# Patient Record
Sex: FEMALE | Race: WHITE | Employment: UNEMPLOYED | URBAN - METROPOLITAN AREA
[De-identification: names, ages, dates, MRNs, and addresses within clinical notes are randomized per-mention and may not be internally consistent; named-entity substitution may affect disease eponyms.]

---

## 2017-03-29 LAB
HEPATITIS B SURFACE ANTIGEN INTERPRETATION: NORMAL
HEPATITIS C ANTIBODY INTERPRETATION: NORMAL

## 2017-03-30 LAB
HIV-1 AND HIV-2 ANTIBODIES: NORMAL
RPR: NORMAL

## 2017-06-09 LAB
HPV COMMENT: NORMAL
HPV TYPE 16: NOT DETECTED
HPV TYPE 18: NOT DETECTED
HPVOH (OTHER TYPES): NOT DETECTED

## 2018-08-12 PROBLEM — L03.315 CELLULITIS OF PERINEUM: Status: ACTIVE | Noted: 2018-08-12

## 2018-08-15 ENCOUNTER — HOSPITAL ENCOUNTER (OUTPATIENT)
Dept: MAMMOGRAPHY | Age: 38
Discharge: HOME | End: 2018-08-15
Admitting: OBSTETRICS & GYNECOLOGY

## 2018-08-15 DIAGNOSIS — Z12.31 ENCOUNTER FOR SCREENING MAMMOGRAM FOR BREAST CANCER: ICD-10-CM

## 2018-08-15 DIAGNOSIS — N90.9 NONINFLAMMATORY DISORDER OF VULVA AND PERINEUM: ICD-10-CM

## 2018-12-28 ENCOUNTER — HOSPITAL ENCOUNTER (OUTPATIENT)
Age: 38
Discharge: HOME | End: 2018-12-28
Payer: COMMERCIAL

## 2018-12-28 PROCEDURE — 93005 ELECTROCARDIOGRAM TRACING: CPT | Performed by: INTERNAL MEDICINE

## 2019-01-01 LAB
EKG ATRIAL RATE: 88 BPM
EKG DIAGNOSIS: NORMAL
EKG P AXIS: 29 DEGREES
EKG P-R INTERVAL: 144 MS
EKG Q-T INTERVAL: 378 MS
EKG QRS DURATION: 86 MS
EKG QTC CALCULATION (BAZETT): 457 MS
EKG R AXIS: 3 DEGREES
EKG T AXIS: 38 DEGREES
EKG VENTRICULAR RATE: 88 BPM

## 2019-01-01 PROCEDURE — 93010 ELECTROCARDIOGRAM REPORT: CPT | Performed by: INTERNAL MEDICINE

## 2019-01-16 ASSESSMENT — ENCOUNTER SYMPTOMS
DIARRHEA: 0
ABDOMINAL PAIN: 0
NAUSEA: 0
BACK PAIN: 0
WHEEZING: 0
SHORTNESS OF BREATH: 0
SORE THROAT: 0
CONSTIPATION: 0
BLOOD IN STOOL: 0
COUGH: 0
SINUS PAIN: 0
VOMITING: 0

## 2019-01-17 ENCOUNTER — OFFICE VISIT (OUTPATIENT)
Dept: INTERNAL MEDICINE CLINIC | Age: 39
End: 2019-01-17
Payer: COMMERCIAL

## 2019-01-17 VITALS
HEART RATE: 96 BPM | SYSTOLIC BLOOD PRESSURE: 124 MMHG | HEIGHT: 69 IN | DIASTOLIC BLOOD PRESSURE: 72 MMHG | WEIGHT: 243.6 LBS | BODY MASS INDEX: 36.08 KG/M2

## 2019-01-17 DIAGNOSIS — E11.9 TYPE 2 DIABETES MELLITUS WITHOUT COMPLICATION, WITHOUT LONG-TERM CURRENT USE OF INSULIN (HCC): ICD-10-CM

## 2019-01-17 DIAGNOSIS — F25.0 SCHIZOAFFECTIVE DISORDER, BIPOLAR TYPE (HCC): ICD-10-CM

## 2019-01-17 DIAGNOSIS — J45.909 UNCOMPLICATED ASTHMA, UNSPECIFIED ASTHMA SEVERITY, UNSPECIFIED WHETHER PERSISTENT: ICD-10-CM

## 2019-01-17 DIAGNOSIS — Z23 NEED FOR PNEUMOCOCCAL VACCINATION: ICD-10-CM

## 2019-01-17 DIAGNOSIS — Z76.89 ENCOUNTER TO ESTABLISH CARE WITH NEW DOCTOR: Primary | ICD-10-CM

## 2019-01-17 DIAGNOSIS — F17.200 SMOKER: ICD-10-CM

## 2019-01-17 DIAGNOSIS — Z23 NEED FOR INFLUENZA VACCINATION: ICD-10-CM

## 2019-01-17 DIAGNOSIS — E78.2 MIXED HYPERLIPIDEMIA: ICD-10-CM

## 2019-01-17 PROCEDURE — 90682 RIV4 VACC RECOMBINANT DNA IM: CPT | Performed by: NURSE PRACTITIONER

## 2019-01-17 PROCEDURE — G0009 ADMIN PNEUMOCOCCAL VACCINE: HCPCS | Performed by: NURSE PRACTITIONER

## 2019-01-17 PROCEDURE — 99204 OFFICE O/P NEW MOD 45 MIN: CPT | Performed by: NURSE PRACTITIONER

## 2019-01-17 PROCEDURE — 90732 PPSV23 VACC 2 YRS+ SUBQ/IM: CPT | Performed by: NURSE PRACTITIONER

## 2019-01-17 PROCEDURE — G0008 ADMIN INFLUENZA VIRUS VAC: HCPCS | Performed by: NURSE PRACTITIONER

## 2019-01-17 RX ORDER — GLIPIZIDE 10 MG/1
10 TABLET, FILM COATED, EXTENDED RELEASE ORAL DAILY
Qty: 30 TABLET | Refills: 1 | Status: SHIPPED | OUTPATIENT
Start: 2019-01-17 | End: 2019-04-06 | Stop reason: SDUPTHER

## 2019-01-17 RX ORDER — TRAZODONE HYDROCHLORIDE 100 MG/1
100 TABLET ORAL NIGHTLY
COMMUNITY

## 2019-01-17 RX ORDER — PRAZOSIN HYDROCHLORIDE 1 MG/1
1 CAPSULE ORAL NIGHTLY
COMMUNITY
End: 2020-07-15 | Stop reason: ALTCHOICE

## 2019-01-17 RX ORDER — GLIPIZIDE 10 MG/1
10 TABLET, FILM COATED, EXTENDED RELEASE ORAL DAILY
COMMUNITY
End: 2019-01-17 | Stop reason: SDUPTHER

## 2019-01-17 RX ORDER — GEMFIBROZIL 600 MG/1
600 TABLET, FILM COATED ORAL 2 TIMES DAILY
Qty: 60 TABLET | Refills: 1 | Status: SHIPPED | OUTPATIENT
Start: 2019-01-17 | End: 2019-04-06 | Stop reason: SDUPTHER

## 2019-01-17 ASSESSMENT — PATIENT HEALTH QUESTIONNAIRE - PHQ9
SUM OF ALL RESPONSES TO PHQ QUESTIONS 1-9: 0
1. LITTLE INTEREST OR PLEASURE IN DOING THINGS: 0
SUM OF ALL RESPONSES TO PHQ QUESTIONS 1-9: 0
SUM OF ALL RESPONSES TO PHQ9 QUESTIONS 1 & 2: 0
2. FEELING DOWN, DEPRESSED OR HOPELESS: 0

## 2019-02-15 DIAGNOSIS — E11.9 TYPE 2 DIABETES MELLITUS WITHOUT COMPLICATION, WITHOUT LONG-TERM CURRENT USE OF INSULIN (HCC): ICD-10-CM

## 2019-02-15 RX ORDER — DULAGLUTIDE 0.75 MG/.5ML
INJECTION, SOLUTION SUBCUTANEOUS
Qty: 4 ML | Refills: 0 | Status: SHIPPED | OUTPATIENT
Start: 2019-02-15 | End: 2019-03-22 | Stop reason: SDUPTHER

## 2019-03-22 DIAGNOSIS — E11.9 TYPE 2 DIABETES MELLITUS WITHOUT COMPLICATION, WITHOUT LONG-TERM CURRENT USE OF INSULIN (HCC): ICD-10-CM

## 2019-03-22 RX ORDER — DULAGLUTIDE 0.75 MG/.5ML
INJECTION, SOLUTION SUBCUTANEOUS
Qty: 2 ML | Refills: 0 | Status: SHIPPED | OUTPATIENT
Start: 2019-03-22 | End: 2019-04-06 | Stop reason: SDUPTHER

## 2019-03-28 ENCOUNTER — HOSPITAL ENCOUNTER (OUTPATIENT)
Age: 39
Discharge: HOME | End: 2019-03-28
Payer: COMMERCIAL

## 2019-03-28 DIAGNOSIS — E11.9 TYPE 2 DIABETES MELLITUS WITHOUT COMPLICATION, WITHOUT LONG-TERM CURRENT USE OF INSULIN (HCC): ICD-10-CM

## 2019-03-28 DIAGNOSIS — E78.2 MIXED HYPERLIPIDEMIA: ICD-10-CM

## 2019-03-28 LAB
A/G RATIO: 1 (ref 1.1–2.2)
ALBUMIN SERPL-MCNC: 4.5 G/DL (ref 3.4–5)
ALP BLD-CCNC: 69 U/L (ref 40–129)
ALT SERPL-CCNC: 23 U/L (ref 10–40)
ANION GAP SERPL CALCULATED.3IONS-SCNC: 15 MMOL/L (ref 3–16)
AST SERPL-CCNC: 22 U/L (ref 15–37)
BASOPHILS ABSOLUTE: 0.1 K/UL (ref 0–0.2)
BASOPHILS RELATIVE PERCENT: 1.1 %
BILIRUB SERPL-MCNC: <0.2 MG/DL (ref 0–1)
BUN BLDV-MCNC: 13 MG/DL (ref 7–20)
CALCIUM SERPL-MCNC: 9.5 MG/DL (ref 8.3–10.6)
CHLORIDE BLD-SCNC: 102 MMOL/L (ref 99–110)
CHOLESTEROL, FASTING: 226 MG/DL (ref 0–199)
CO2: 22 MMOL/L (ref 21–32)
CREAT SERPL-MCNC: 0.6 MG/DL (ref 0.6–1.1)
CREATININE URINE: 177.4 MG/DL (ref 28–259)
EOSINOPHILS ABSOLUTE: 0.3 K/UL (ref 0–0.6)
EOSINOPHILS RELATIVE PERCENT: 2.5 %
GFR AFRICAN AMERICAN: >60
GFR NON-AFRICAN AMERICAN: >60
GLOBULIN: 4.3 G/DL
GLUCOSE BLD-MCNC: 193 MG/DL (ref 70–99)
HCT VFR BLD CALC: 41.7 % (ref 36–48)
HDLC SERPL-MCNC: 36 MG/DL (ref 40–60)
HEMOGLOBIN: 13.9 G/DL (ref 12–16)
LDL CHOLESTEROL CALCULATED: 146 MG/DL
LYMPHOCYTES ABSOLUTE: 3.5 K/UL (ref 1–5.1)
LYMPHOCYTES RELATIVE PERCENT: 27 %
MCH RBC QN AUTO: 29 PG (ref 26–34)
MCHC RBC AUTO-ENTMCNC: 33.2 G/DL (ref 31–36)
MCV RBC AUTO: 87.3 FL (ref 80–100)
MICROALBUMIN UR-MCNC: 1.9 MG/DL
MICROALBUMIN/CREAT UR-RTO: 10.7 MG/G (ref 0–30)
MONOCYTES ABSOLUTE: 0.9 K/UL (ref 0–1.3)
MONOCYTES RELATIVE PERCENT: 6.9 %
NEUTROPHILS ABSOLUTE: 8 K/UL (ref 1.7–7.7)
NEUTROPHILS RELATIVE PERCENT: 62.5 %
PDW BLD-RTO: 13.4 % (ref 12.4–15.4)
PLATELET # BLD: 249 K/UL (ref 135–450)
PMV BLD AUTO: 9.6 FL (ref 5–10.5)
POTASSIUM SERPL-SCNC: 4.3 MMOL/L (ref 3.5–5.1)
RBC # BLD: 4.77 M/UL (ref 4–5.2)
SODIUM BLD-SCNC: 139 MMOL/L (ref 136–145)
TOTAL PROTEIN: 8.8 G/DL (ref 6.4–8.2)
TRIGLYCERIDE, FASTING: 220 MG/DL (ref 0–150)
VLDLC SERPL CALC-MCNC: 44 MG/DL
WBC # BLD: 12.8 K/UL (ref 4–11)

## 2019-03-28 PROCEDURE — 80061 LIPID PANEL: CPT

## 2019-03-28 PROCEDURE — 82570 ASSAY OF URINE CREATININE: CPT

## 2019-03-28 PROCEDURE — 85025 COMPLETE CBC W/AUTO DIFF WBC: CPT

## 2019-03-28 PROCEDURE — 82043 UR ALBUMIN QUANTITATIVE: CPT

## 2019-03-28 PROCEDURE — 80053 COMPREHEN METABOLIC PANEL: CPT

## 2019-03-28 PROCEDURE — 83036 HEMOGLOBIN GLYCOSYLATED A1C: CPT

## 2019-03-28 PROCEDURE — 36415 COLL VENOUS BLD VENIPUNCTURE: CPT

## 2019-03-29 LAB
ESTIMATED AVERAGE GLUCOSE: 165.7 MG/DL
HBA1C MFR BLD: 7.4 %

## 2019-04-15 ASSESSMENT — ENCOUNTER SYMPTOMS
VOMITING: 0
CONSTIPATION: 0
ABDOMINAL PAIN: 0
WHEEZING: 0
SHORTNESS OF BREATH: 0
COUGH: 0
DIARRHEA: 0
NAUSEA: 0

## 2019-04-15 NOTE — PROGRESS NOTES
reviewed with the pt. - gemfibrozil (LOPID) 600 MG tablet; Take 1 tablet by mouth 2 times daily    Uncomplicated asthma, unspecified asthma severity, unspecified whether persistent   - Continue albuterol as needed. Smoker   - Smoking cessation recommended. The patient is smoking 2 packs per day. She is not interested in quitting at this time. Schizoaffective Disorder              -           The patient is seeing [employer]. She sees a psychiatrist/counseling once monthly.               -           Pt states she is happy on this current regimen    Need for Tdap vaccination   -Last Tetanus was just over 10 years ago- administered today. - patient education handout reviewed with the pt. Return in about 3 months (around 7/18/2019) for DM/HLD/tetanus f/u, or sooner if needed. Pt will call if symptoms worsen or fail to improve. All questions answered. Pt states no further questions or concerns at this time.    Electronically signed by: Christiano Ruiz 04/18/19

## 2019-04-18 ENCOUNTER — OFFICE VISIT (OUTPATIENT)
Dept: INTERNAL MEDICINE CLINIC | Age: 39
End: 2019-04-18
Payer: COMMERCIAL

## 2019-04-18 VITALS
WEIGHT: 241.8 LBS | SYSTOLIC BLOOD PRESSURE: 118 MMHG | DIASTOLIC BLOOD PRESSURE: 72 MMHG | HEART RATE: 96 BPM | BODY MASS INDEX: 35.81 KG/M2 | HEIGHT: 69 IN

## 2019-04-18 DIAGNOSIS — E78.2 MIXED HYPERLIPIDEMIA: ICD-10-CM

## 2019-04-18 DIAGNOSIS — Z23 NEED FOR TDAP VACCINATION: ICD-10-CM

## 2019-04-18 DIAGNOSIS — F17.200 SMOKER: ICD-10-CM

## 2019-04-18 DIAGNOSIS — E11.9 TYPE 2 DIABETES MELLITUS WITHOUT COMPLICATION, WITHOUT LONG-TERM CURRENT USE OF INSULIN (HCC): Primary | ICD-10-CM

## 2019-04-18 DIAGNOSIS — J45.909 UNCOMPLICATED ASTHMA, UNSPECIFIED ASTHMA SEVERITY, UNSPECIFIED WHETHER PERSISTENT: ICD-10-CM

## 2019-04-18 DIAGNOSIS — F25.0 SCHIZOAFFECTIVE DISORDER, BIPOLAR TYPE (HCC): ICD-10-CM

## 2019-04-18 PROCEDURE — 90715 TDAP VACCINE 7 YRS/> IM: CPT | Performed by: NURSE PRACTITIONER

## 2019-04-18 PROCEDURE — 90471 IMMUNIZATION ADMIN: CPT | Performed by: NURSE PRACTITIONER

## 2019-04-18 PROCEDURE — 99214 OFFICE O/P EST MOD 30 MIN: CPT | Performed by: NURSE PRACTITIONER

## 2019-04-18 RX ORDER — GLIPIZIDE 10 MG/1
10 TABLET, FILM COATED, EXTENDED RELEASE ORAL DAILY
Qty: 30 TABLET | Refills: 2 | Status: SHIPPED | OUTPATIENT
Start: 2019-04-18 | End: 2019-07-11 | Stop reason: SDUPTHER

## 2019-04-18 RX ORDER — GEMFIBROZIL 600 MG/1
600 TABLET, FILM COATED ORAL 2 TIMES DAILY
Qty: 60 TABLET | Refills: 2 | Status: SHIPPED | OUTPATIENT
Start: 2019-04-18 | End: 2019-07-11 | Stop reason: SDUPTHER

## 2019-06-03 DIAGNOSIS — E11.9 TYPE 2 DIABETES MELLITUS WITHOUT COMPLICATION, WITHOUT LONG-TERM CURRENT USE OF INSULIN (HCC): ICD-10-CM

## 2019-06-03 RX ORDER — DULAGLUTIDE 0.75 MG/.5ML
INJECTION, SOLUTION SUBCUTANEOUS
Qty: 2 ML | Refills: 1 | Status: SHIPPED | OUTPATIENT
Start: 2019-06-03 | End: 2019-07-11 | Stop reason: SDUPTHER

## 2019-06-03 RX ORDER — SITAGLIPTIN 100 MG/1
TABLET, FILM COATED ORAL
Qty: 30 TABLET | Refills: 0 | OUTPATIENT
Start: 2019-06-03

## 2019-07-03 ASSESSMENT — ENCOUNTER SYMPTOMS
ABDOMINAL PAIN: 0
DIARRHEA: 0
WHEEZING: 0
COUGH: 0
VOMITING: 0
SHORTNESS OF BREATH: 0
NAUSEA: 0
CONSTIPATION: 0

## 2019-07-03 NOTE — PROGRESS NOTES
Office Visit   7/11/2019    Subjective:  Chief Complaint   Patient presents with    Diabetes     3 month f/u a1c     HPI:  Marilee Herbert is a 45 y.o. female who presents to the clinic today for follow up. DM- Taking Glipizide ER 10mg daily and Januvia 100 mg PO daily and Trulicity SQ weekly. She states she has been on these medication for two years without side effects. A1C was not at goal. No hypoglycemic events. She does not take her blood sugar at home. Asymptomatic. Metformin caused \"explosive diarrhea. \" She states that she is not eating healthy and is not exercising. Drinks a lot of soda.     Asthma- childhood asthma. Uses albuterol as needed with relief.     Hyperlipidemia-the patient is taking gemfibrozil 1 tablet by mouth twice daily without side effects. She would like to continue this medication at this time.     Mood- Continues to follow with Cecy Echavarria. Following with psychiatry and psychology. Stable at this time. No SI/HI.     Smoker- Continues to smoke 2 ppd. Not interested in quitting. CBC next visit- elevated WBC- pt states this is chronic. Will re-evaluate today. Asymptomatic. Pt reports white thick vaginal discharge. No itching. No vaginal bleeding. No urinary symptoms- hematuria, dysuria, frequency, urgency, odor or cloudiness. More sexually active recently per pt. Not using protection. LMP was 06/17/19. Review of Systems   Constitutional: Negative for appetite change, chills, fatigue, fever and unexpected weight change. Eyes: Negative for visual disturbance. Respiratory: Negative for cough, shortness of breath and wheezing. Cardiovascular: Negative for chest pain, palpitations and leg swelling. Gastrointestinal: Negative for abdominal pain, blood in stool, constipation, diarrhea, nausea and vomiting. Endocrine: Negative for polydipsia, polyphagia and polyuria. Genitourinary: Positive for vaginal discharge.  Negative for decreased urine volume, difficulty elevated. Will redraw today. - CBC Auto Differential; Future    Return in about 3 months (around 10/11/2019) for DM/HLD/asthma/smoker f/u, or sooner if needed. Pt will call if symptoms worsen or fail to improve. All questions answered. Pt states no further questions or concerns at this time.    Electronically signed by: Renan Shabazz 07/11/19

## 2019-07-11 ENCOUNTER — OFFICE VISIT (OUTPATIENT)
Dept: INTERNAL MEDICINE CLINIC | Age: 39
End: 2019-07-11
Payer: COMMERCIAL

## 2019-07-11 VITALS
WEIGHT: 242 LBS | SYSTOLIC BLOOD PRESSURE: 130 MMHG | BODY MASS INDEX: 35.84 KG/M2 | OXYGEN SATURATION: 98 % | DIASTOLIC BLOOD PRESSURE: 85 MMHG | HEART RATE: 100 BPM | HEIGHT: 69 IN

## 2019-07-11 DIAGNOSIS — F17.200 SMOKER: ICD-10-CM

## 2019-07-11 DIAGNOSIS — D72.828 OTHER ELEVATED WHITE BLOOD CELL (WBC) COUNT: ICD-10-CM

## 2019-07-11 DIAGNOSIS — E11.9 TYPE 2 DIABETES MELLITUS WITHOUT COMPLICATION, WITHOUT LONG-TERM CURRENT USE OF INSULIN (HCC): Primary | ICD-10-CM

## 2019-07-11 DIAGNOSIS — E78.2 MIXED HYPERLIPIDEMIA: ICD-10-CM

## 2019-07-11 DIAGNOSIS — N89.8 VAGINAL DISCHARGE: ICD-10-CM

## 2019-07-11 DIAGNOSIS — J45.909 UNCOMPLICATED ASTHMA, UNSPECIFIED ASTHMA SEVERITY, UNSPECIFIED WHETHER PERSISTENT: ICD-10-CM

## 2019-07-11 LAB
BASOPHILS ABSOLUTE: 0.1 K/UL (ref 0–0.2)
BASOPHILS RELATIVE PERCENT: 0.6 %
CONTROL: NORMAL
EOSINOPHILS ABSOLUTE: 0.3 K/UL (ref 0–0.6)
EOSINOPHILS RELATIVE PERCENT: 2.2 %
HBA1C MFR BLD: 7.5 %
HCT VFR BLD CALC: 42 % (ref 36–48)
HEMOGLOBIN: 13.8 G/DL (ref 12–16)
LYMPHOCYTES ABSOLUTE: 4.6 K/UL (ref 1–5.1)
LYMPHOCYTES RELATIVE PERCENT: 30.4 %
MCH RBC QN AUTO: 29 PG (ref 26–34)
MCHC RBC AUTO-ENTMCNC: 32.9 G/DL (ref 31–36)
MCV RBC AUTO: 88 FL (ref 80–100)
MONOCYTES ABSOLUTE: 1.3 K/UL (ref 0–1.3)
MONOCYTES RELATIVE PERCENT: 8.6 %
NEUTROPHILS ABSOLUTE: 8.7 K/UL (ref 1.7–7.7)
NEUTROPHILS RELATIVE PERCENT: 58.2 %
PDW BLD-RTO: 13.2 % (ref 12.4–15.4)
PLATELET # BLD: 258 K/UL (ref 135–450)
PMV BLD AUTO: 9 FL (ref 5–10.5)
PREGNANCY TEST URINE, POC: NEGATIVE
RBC # BLD: 4.77 M/UL (ref 4–5.2)
WBC # BLD: 15 K/UL (ref 4–11)

## 2019-07-11 PROCEDURE — 99214 OFFICE O/P EST MOD 30 MIN: CPT | Performed by: NURSE PRACTITIONER

## 2019-07-11 PROCEDURE — 81025 URINE PREGNANCY TEST: CPT | Performed by: NURSE PRACTITIONER

## 2019-07-11 PROCEDURE — 83036 HEMOGLOBIN GLYCOSYLATED A1C: CPT | Performed by: NURSE PRACTITIONER

## 2019-07-11 RX ORDER — ALBUTEROL SULFATE 90 UG/1
2 AEROSOL, METERED RESPIRATORY (INHALATION) EVERY 6 HOURS PRN
Qty: 1 INHALER | Refills: 1 | Status: SHIPPED | OUTPATIENT
Start: 2019-07-11 | End: 2019-10-15 | Stop reason: SDUPTHER

## 2019-07-11 RX ORDER — GLIPIZIDE 10 MG/1
20 TABLET, FILM COATED, EXTENDED RELEASE ORAL DAILY
Qty: 60 TABLET | Refills: 2 | Status: SHIPPED | OUTPATIENT
Start: 2019-07-11 | End: 2019-10-15 | Stop reason: SDUPTHER

## 2019-07-11 RX ORDER — GEMFIBROZIL 600 MG/1
600 TABLET, FILM COATED ORAL 2 TIMES DAILY
Qty: 60 TABLET | Refills: 2 | Status: SHIPPED | OUTPATIENT
Start: 2019-07-11 | End: 2019-10-15 | Stop reason: SDUPTHER

## 2019-07-11 ASSESSMENT — ENCOUNTER SYMPTOMS
BLOOD IN STOOL: 0
BACK PAIN: 0

## 2019-07-12 LAB
C. TRACHOMATIS DNA ,URINE: NEGATIVE
CANDIDA SPECIES, DNA PROBE: NORMAL
GARDNERELLA VAGINALIS, DNA PROBE: NORMAL
N. GONORRHOEAE DNA, URINE: NEGATIVE
TRICHOMONAS VAGINALIS DNA: NORMAL

## 2019-08-06 DIAGNOSIS — E11.9 TYPE 2 DIABETES MELLITUS WITHOUT COMPLICATION, WITHOUT LONG-TERM CURRENT USE OF INSULIN (HCC): ICD-10-CM

## 2019-08-06 RX ORDER — DULAGLUTIDE 0.75 MG/.5ML
INJECTION, SOLUTION SUBCUTANEOUS
Qty: 2 ML | Refills: 0 | Status: SHIPPED | OUTPATIENT
Start: 2019-08-06 | End: 2019-10-07

## 2019-09-07 DIAGNOSIS — E11.9 TYPE 2 DIABETES MELLITUS WITHOUT COMPLICATION, WITHOUT LONG-TERM CURRENT USE OF INSULIN (HCC): ICD-10-CM

## 2019-09-10 RX ORDER — SITAGLIPTIN 100 MG/1
TABLET, FILM COATED ORAL
Qty: 30 TABLET | Refills: 1 | Status: SHIPPED | OUTPATIENT
Start: 2019-09-10 | End: 2019-10-15 | Stop reason: SDUPTHER

## 2019-10-06 DIAGNOSIS — E11.9 TYPE 2 DIABETES MELLITUS WITHOUT COMPLICATION, WITHOUT LONG-TERM CURRENT USE OF INSULIN (HCC): ICD-10-CM

## 2019-10-15 ENCOUNTER — OFFICE VISIT (OUTPATIENT)
Dept: INTERNAL MEDICINE CLINIC | Age: 39
End: 2019-10-15
Payer: COMMERCIAL

## 2019-10-15 VITALS
DIASTOLIC BLOOD PRESSURE: 70 MMHG | WEIGHT: 239 LBS | HEART RATE: 110 BPM | SYSTOLIC BLOOD PRESSURE: 124 MMHG | OXYGEN SATURATION: 98 % | BODY MASS INDEX: 35.29 KG/M2

## 2019-10-15 DIAGNOSIS — F17.200 SMOKER: ICD-10-CM

## 2019-10-15 DIAGNOSIS — D72.828 OTHER ELEVATED WHITE BLOOD CELL (WBC) COUNT: ICD-10-CM

## 2019-10-15 DIAGNOSIS — E78.2 MIXED HYPERLIPIDEMIA: ICD-10-CM

## 2019-10-15 DIAGNOSIS — E11.9 TYPE 2 DIABETES MELLITUS WITHOUT COMPLICATION, WITHOUT LONG-TERM CURRENT USE OF INSULIN (HCC): Primary | ICD-10-CM

## 2019-10-15 DIAGNOSIS — Z23 NEED FOR INFLUENZA VACCINATION: ICD-10-CM

## 2019-10-15 DIAGNOSIS — F25.0 SCHIZOAFFECTIVE DISORDER, BIPOLAR TYPE (HCC): ICD-10-CM

## 2019-10-15 DIAGNOSIS — J45.909 UNCOMPLICATED ASTHMA, UNSPECIFIED ASTHMA SEVERITY, UNSPECIFIED WHETHER PERSISTENT: ICD-10-CM

## 2019-10-15 LAB — HBA1C MFR BLD: 7.4 %

## 2019-10-15 PROCEDURE — 99214 OFFICE O/P EST MOD 30 MIN: CPT | Performed by: NURSE PRACTITIONER

## 2019-10-15 PROCEDURE — 83036 HEMOGLOBIN GLYCOSYLATED A1C: CPT | Performed by: NURSE PRACTITIONER

## 2019-10-15 PROCEDURE — 90686 IIV4 VACC NO PRSV 0.5 ML IM: CPT | Performed by: NURSE PRACTITIONER

## 2019-10-15 PROCEDURE — G0008 ADMIN INFLUENZA VIRUS VAC: HCPCS | Performed by: NURSE PRACTITIONER

## 2019-10-15 RX ORDER — GEMFIBROZIL 600 MG/1
600 TABLET, FILM COATED ORAL 2 TIMES DAILY
Qty: 60 TABLET | Refills: 2 | Status: SHIPPED | OUTPATIENT
Start: 2019-10-15 | End: 2020-01-14 | Stop reason: SDUPTHER

## 2019-10-15 RX ORDER — ALBUTEROL SULFATE 90 UG/1
2 AEROSOL, METERED RESPIRATORY (INHALATION) EVERY 6 HOURS PRN
Qty: 1 INHALER | Refills: 1 | Status: SHIPPED | OUTPATIENT
Start: 2019-10-15 | End: 2020-01-14 | Stop reason: SDUPTHER

## 2019-10-15 RX ORDER — GLIPIZIDE 10 MG/1
20 TABLET, FILM COATED, EXTENDED RELEASE ORAL DAILY
Qty: 60 TABLET | Refills: 2 | Status: SHIPPED | OUTPATIENT
Start: 2019-10-15 | End: 2020-01-14 | Stop reason: SDUPTHER

## 2019-10-15 ASSESSMENT — ENCOUNTER SYMPTOMS
WHEEZING: 0
ABDOMINAL PAIN: 0
VOMITING: 0
SHORTNESS OF BREATH: 0
COUGH: 0
CONSTIPATION: 0
DIARRHEA: 0
NAUSEA: 0

## 2019-11-01 LAB
HEPATITIS B SURFACE ANTIGEN INTERPRETATION: NORMAL
HEPATITIS C ANTIBODY INTERPRETATION: NORMAL

## 2019-11-02 LAB
HIV AG/AB: NORMAL
HIV ANTIGEN: NORMAL
HIV-1 ANTIBODY: NORMAL
HIV-2 AB: NORMAL

## 2019-11-04 LAB
ORGANISM: ABNORMAL
URINE CULTURE, ROUTINE: ABNORMAL

## 2020-01-09 ASSESSMENT — ENCOUNTER SYMPTOMS
DIARRHEA: 0
COUGH: 0
WHEEZING: 0
SHORTNESS OF BREATH: 0
CONSTIPATION: 0
ABDOMINAL PAIN: 0
NAUSEA: 0
VOMITING: 0

## 2020-01-09 NOTE — PROGRESS NOTES
Hyperlipidemia      Wt Readings from Last 3 Encounters:   01/14/20 235 lb (106.6 kg)   10/15/19 239 lb (108.4 kg)   07/11/19 242 lb (109.8 kg)     BP Readings from Last 3 Encounters:   01/14/20 124/70   10/15/19 124/70   07/11/19 130/85     The ASCVD Risk score (Kesha Cardoza, et al., 2013) failed to calculate for the following reasons: The 2013 ASCVD risk score is only valid for ages 36 to 78    PHQ-9 Total Score: 3 (1/14/2020  2:38 PM)  Thoughts that you would be better off dead, or of hurting yourself in some way: 0 (1/14/2020  2:38 PM)    Objective/Physical Exam:  /70   Pulse 91   Wt 235 lb (106.6 kg)   SpO2 98%   BMI 34.70 kg/m²   Body mass index is 34.7 kg/m². Physical Exam  Vitals signs reviewed. Constitutional:       General: She is not in acute distress. Appearance: She is well-developed. She is not diaphoretic. HENT:      Head: Normocephalic and atraumatic. Nose: Nose normal.      Mouth/Throat:      Mouth: Mucous membranes are moist.   Eyes:      Pupils: Pupils are equal, round, and reactive to light. Cardiovascular:      Rate and Rhythm: Normal rate and regular rhythm. Pulmonary:      Effort: Pulmonary effort is normal. No respiratory distress. Breath sounds: Normal breath sounds. No wheezing or rales. Chest:      Chest wall: No tenderness. Abdominal:      General: Bowel sounds are normal.      Palpations: Abdomen is soft. Skin:     General: Skin is warm and dry. Coloration: Skin is not pale. Findings: No erythema or rash. Neurological:      Mental Status: She is alert and oriented to person, place, and time. Coordination: Coordination normal.   Psychiatric:         Mood and Affect: Mood normal.       Assessment and Plan:  Aaron Nicholson was seen today for diabetes and mood swings. Diagnoses and all orders for this visit:    Type 2 diabetes mellitus without complication, without long-term current use of insulin ([address]  -    Not to goal today.    -     POCT worsen or fail to improve. All questions answered. Pt states no further questions or concerns at this time.    Electronically signed by: Josefa Alcantar 01/14/20

## 2020-01-14 ENCOUNTER — OFFICE VISIT (OUTPATIENT)
Dept: INTERNAL MEDICINE CLINIC | Age: 40
End: 2020-01-14
Payer: COMMERCIAL

## 2020-01-14 VITALS
DIASTOLIC BLOOD PRESSURE: 70 MMHG | SYSTOLIC BLOOD PRESSURE: 124 MMHG | HEART RATE: 91 BPM | OXYGEN SATURATION: 98 % | WEIGHT: 235 LBS | BODY MASS INDEX: 34.7 KG/M2

## 2020-01-14 LAB — HBA1C MFR BLD: 7.5 %

## 2020-01-14 PROCEDURE — 83036 HEMOGLOBIN GLYCOSYLATED A1C: CPT | Performed by: NURSE PRACTITIONER

## 2020-01-14 PROCEDURE — G0444 DEPRESSION SCREEN ANNUAL: HCPCS | Performed by: NURSE PRACTITIONER

## 2020-01-14 PROCEDURE — 99214 OFFICE O/P EST MOD 30 MIN: CPT | Performed by: NURSE PRACTITIONER

## 2020-01-14 RX ORDER — FAMOTIDINE 40 MG/1
40 TABLET, FILM COATED ORAL EVERY EVENING
Qty: 90 TABLET | Refills: 1 | Status: SHIPPED | OUTPATIENT
Start: 2020-01-14 | End: 2020-03-19

## 2020-01-14 RX ORDER — GEMFIBROZIL 600 MG/1
600 TABLET, FILM COATED ORAL 2 TIMES DAILY
Qty: 60 TABLET | Refills: 2 | Status: SHIPPED | OUTPATIENT
Start: 2020-01-14 | End: 2020-04-15 | Stop reason: SDUPTHER

## 2020-01-14 RX ORDER — ALBUTEROL SULFATE 90 UG/1
2 AEROSOL, METERED RESPIRATORY (INHALATION) EVERY 6 HOURS PRN
Qty: 1 INHALER | Refills: 1 | Status: SHIPPED | OUTPATIENT
Start: 2020-01-14 | End: 2020-03-04

## 2020-01-14 RX ORDER — GLIPIZIDE 10 MG/1
20 TABLET, FILM COATED, EXTENDED RELEASE ORAL DAILY
Qty: 60 TABLET | Refills: 2 | Status: SHIPPED | OUTPATIENT
Start: 2020-01-14 | End: 2020-04-15 | Stop reason: SDUPTHER

## 2020-01-14 SDOH — ECONOMIC STABILITY: TRANSPORTATION INSECURITY
IN THE PAST 12 MONTHS, HAS THE LACK OF TRANSPORTATION KEPT YOU FROM MEDICAL APPOINTMENTS OR FROM GETTING MEDICATIONS?: NO

## 2020-01-14 SDOH — ECONOMIC STABILITY: FOOD INSECURITY: WITHIN THE PAST 12 MONTHS, YOU WORRIED THAT YOUR FOOD WOULD RUN OUT BEFORE YOU GOT MONEY TO BUY MORE.: NEVER TRUE

## 2020-01-14 SDOH — ECONOMIC STABILITY: FOOD INSECURITY: WITHIN THE PAST 12 MONTHS, THE FOOD YOU BOUGHT JUST DIDN'T LAST AND YOU DIDN'T HAVE MONEY TO GET MORE.: NEVER TRUE

## 2020-01-14 SDOH — ECONOMIC STABILITY: INCOME INSECURITY: HOW HARD IS IT FOR YOU TO PAY FOR THE VERY BASICS LIKE FOOD, HOUSING, MEDICAL CARE, AND HEATING?: NOT HARD AT ALL

## 2020-01-14 SDOH — ECONOMIC STABILITY: TRANSPORTATION INSECURITY
IN THE PAST 12 MONTHS, HAS LACK OF TRANSPORTATION KEPT YOU FROM MEETINGS, WORK, OR FROM GETTING THINGS NEEDED FOR DAILY LIVING?: NO

## 2020-01-14 ASSESSMENT — PATIENT HEALTH QUESTIONNAIRE - PHQ9
1. LITTLE INTEREST OR PLEASURE IN DOING THINGS: 0
4. FEELING TIRED OR HAVING LITTLE ENERGY: 1
SUM OF ALL RESPONSES TO PHQ9 QUESTIONS 1 & 2: 0
9. THOUGHTS THAT YOU WOULD BE BETTER OFF DEAD, OR OF HURTING YOURSELF: 0
SUM OF ALL RESPONSES TO PHQ QUESTIONS 1-9: 3
5. POOR APPETITE OR OVEREATING: 1
SUM OF ALL RESPONSES TO PHQ QUESTIONS 1-9: 3
2. FEELING DOWN, DEPRESSED OR HOPELESS: 0
8. MOVING OR SPEAKING SO SLOWLY THAT OTHER PEOPLE COULD HAVE NOTICED. OR THE OPPOSITE, BEING SO FIGETY OR RESTLESS THAT YOU HAVE BEEN MOVING AROUND A LOT MORE THAN USUAL: 0
7. TROUBLE CONCENTRATING ON THINGS, SUCH AS READING THE NEWSPAPER OR WATCHING TELEVISION: 0
3. TROUBLE FALLING OR STAYING ASLEEP: 1
6. FEELING BAD ABOUT YOURSELF - OR THAT YOU ARE A FAILURE OR HAVE LET YOURSELF OR YOUR FAMILY DOWN: 0

## 2020-01-14 NOTE — PATIENT INSTRUCTIONS
infections or other urination problems;  · low blood pressure;  · heart problems;  · problems with your pancreas, including surgery;  · if you drink alcohol often; or  · if you are on a low salt diet. Follow your doctor's instructions about using this medicine if you are pregnant. Blood sugar control is very important during pregnancy, and your dose needs may be different during each trimester. You should not use empagliflozin during the second or third trimester of pregnancy. You should not breast-feed while using this medicine. Empagliflozin is not approved for use by anyone younger than 25years old. How should I take empagliflozin? Follow all directions on your prescription label and read all medication guides or instruction sheets. Your doctor may occasionally change your dose. Use the medicine exactly as directed. You may take empagliflozin with or without food. Call your doctor if you are sick with vomiting or diarrhea, if you consume less food or fluid than usual, or if you are sweating more than usual.  Your blood sugar will need to be checked often, and you may also need to test the level of ketones your urine. Empagliflozin can cause life-threatening ketoacidosis (too much acid in the blood). Even if your blood sugar is normal, contact your doctor if a urine test shows that you have ketones in the urine. Low blood sugar (hypoglycemia) can happen to everyone who has diabetes. Symptoms include headache, hunger, sweating, irritability, dizziness, nausea, fast heart rate, and feeling anxious or shaky. To quickly treat low blood sugar, always keep a fast-acting source of sugar with you such as fruit juice, hard candy, crackers, raisins, or non-diet soda. Your doctor can prescribe a glucagon emergency injection kit to use in case you have severe hypoglycemia and cannot eat or drink. Be sure your family and close friends know how to give you this injection in an emergency.   Also watch for signs of high warning for a given drug or drug combination in no way should be construed to indicate that the drug or drug combination is safe, effective or appropriate for any given patient. [facility] does not assume any responsibility for any aspect of healthcare administered with the aid of information [facility] provides. The information contained herein is not intended to cover all possible uses, directions, precautions, warnings, drug interactions, allergic reactions, or adverse effects. If you have questions about the drugs you are taking, check with your doctor, nurse or pharmacist.  Copyright 5302-2206 [address]. Version: 3.01. Revision date: 8/30/2018. Care instructions adapted under license by [facility] ([facility]). If you have questions about a medical condition or this instruction, always ask your healthcare professional. [place] [address] any warranty or liability for your use of this information. Patient Education        famotidine  Pronunciation:  fam OH ti boo  Brand:  Heartburn Relief, Pepcid, Pepcid AC, Pepcid AC Maximum Strength  What is the most important information I should know about famotidine? Follow all directions on your medicine label and package. Tell each of your healthcare providers about all your medical conditions, allergies, and all medicines you use. What is famotidine? Famotidine is used to treat and prevent ulcers in the stomach and intestines. It also treats conditions in which the stomach produces too much acid, such as Zollinger-Loera syndrome. Famotidine also treats gastroesophageal reflux disease (GERD) and other conditions in which acid backs up from the stomach into the esophagus, causing heartburn. Famotidine may also be used for purposes not listed in this medication guide. What should I discuss with my healthcare provider before taking famotidine? Heartburn can mimic early symptoms of a heart attack.  Get emergency medical help if you have chest pain that spreads to your jaw or shoulder and you feel anxious or light-headed. You should not use this medicine if you are allergic to famotidine or similar medicines such as ranitidine (Zantac), cimetidine (Tagamet), or nizatidine (Axid). Ask a doctor or pharmacist if this medicine is safe to use if you have:  · kidney disease;  · liver disease;  · cancer stomach; or  · long QT syndrome (in you or a family member). Ask a doctor before using this medicine if you are pregnant or breast-feeding. How should I take famotidine? Use exactly as directed on the label, or as prescribed by your doctor. You may take famotidine with or without food. Measure liquid medicine carefully. Use the dosing syringe provided, or use a medicine dose-measuring device (not a kitchen spoon). Most ulcers heal within 4 weeks of famotidine treatment, but it may take up to 8 weeks of using this medicine before your ulcer heals. Keep using the medication as directed. Call your doctor if the condition you are treating with famotidine does not improve, or if it gets worse while using famotidine. Famotidine may be only part of a complete program of treatment that also includes changes in diet or lifestyle habits. Follow all instructions of your doctor or dietitian. Store at room temperature away from moisture, heat, and light. Do not allow the liquid  medicine to freeze. Throw away any unused famotidine liquid that is older than 30 days. What happens if I miss a dose? Take the medicine as soon as you can, but skip the missed dose if it is almost time for your next dose. Do not take two doses at one time. What happens if I overdose? Seek emergency medical attention or call the Poison Help line at 1-527.870.2511. What should I avoid while taking famotidine? Avoid drinking alcohol. It can increase the risk of damage to your stomach. Avoid taking other stomach acid reducers unless your doctor has told you to.  However, you may take an antacid Drug information contained herein may be time sensitive. [employer] information has been compiled for use by healthcare practitioners and consumers in the [place] and therefore [employer] does not warrant that uses outside of the [place] are appropriate, unless specifically indicated otherwise. [facility] drug information does not endorse drugs, diagnose patients or recommend therapy. [employer] drug information is an informational resource designed to assist licensed healthcare practitioners in caring for their patients and/or to serve consumers viewing this service as a supplement to, and not a substitute for, the expertise, skill, knowledge and judgment of healthcare practitioners. The absence of a warning for a given drug or drug combination in no way should be construed to indicate that the drug or drug combination is safe, effective or appropriate for any given patient. [facility] does not assume any responsibility for any aspect of healthcare administered with the aid of information [facility] provides. The information contained herein is not intended to cover all possible uses, directions, precautions, warnings, drug interactions, allergic reactions, or adverse effects. If you have questions about the drugs you are taking, check with your doctor, nurse or pharmacist.  Copyright 9975-0766 [address]. Version: 16.01. Revision date: 8/22/2019. Care instructions adapted under license by [facility] ([facility]). If you have questions about a medical condition or this instruction, always ask your healthcare professional. [place] [address] any warranty or liability for your use of this information. Patient Education        Gastroesophageal Reflux Disease (GERD): Care Instructions  Your Care Instructions    Gastroesophageal reflux disease (GERD) is the backward flow of stomach acid into the esophagus. The esophagus is the tube that leads from your throat to your stomach.  A one-way valve prevents the stomach acid from moving up into this tube. When you have GERD, this valve does not close tightly enough. If you have mild GERD symptoms including heartburn, you may be able to control the problem with antacids or over-the-counter medicine. Changing your diet, losing weight, and making other lifestyle changes can also help reduce symptoms. Follow-up care is a key part of your treatment and safety. Be sure to make and go to all appointments, and call your doctor if you are having problems. It's also a good idea to know your test results and keep a list of the medicines you take. How can you care for yourself at home? · Take your medicines exactly as prescribed. Call your doctor if you think you are having a problem with your medicine. · Your doctor may recommend over-the-counter medicine. For mild or occasional indigestion, antacids, such as Tums, Gaviscon, Mylanta, or Maalox, may help. Your doctor also may recommend over-the-counter acid reducers, such as Pepcid AC, Tagamet HB, Zantac 75, or Prilosec. Read and follow all instructions on the label. If you use these medicines often, talk with your doctor. · Change your eating habits. ? It's best to eat several small meals instead of two or three large meals. ? After you eat, wait 2 to 3 hours before you lie down. ? Chocolate, mint, and alcohol can make GERD worse. ? Spicy foods, foods that have a lot of acid (like tomatoes and oranges), and coffee can make GERD symptoms worse in some people. If your symptoms are worse after you eat a certain food, you may want to stop eating that food to see if your symptoms get better. · Do not smoke or chew tobacco. Smoking can make GERD worse. If you need help quitting, talk to your doctor about stop-smoking programs and medicines. These can increase your chances of quitting for good.   · If you have GERD symptoms at night, raise the head of your bed 6 to 8 inches by putting the frame on blocks or placing a foam wedge

## 2020-01-28 ENCOUNTER — TELEPHONE (OUTPATIENT)
Dept: INTERNAL MEDICINE CLINIC | Age: 40
End: 2020-01-28

## 2020-01-28 ENCOUNTER — TELEPHONE (OUTPATIENT)
Dept: ADMINISTRATIVE | Age: 40
End: 2020-01-28

## 2020-01-28 NOTE — TELEPHONE ENCOUNTER
Called in to pharmacy
I called the patient. She is agreeable to Pepcid 20 mg dose. Please call and inform the pharmacy.
Patient was given pepcid ac 40 but that medication is not carried at her pharmacy, so she is asking if she may be prescribed nexium instead.
Controlled with current regime
I have reviewed and confirmed nurses' notes for patient's medications, allergies, medical history, and surgical history.

## 2020-03-04 RX ORDER — ALBUTEROL SULFATE 90 UG/1
AEROSOL, METERED RESPIRATORY (INHALATION)
Qty: 18 G | Refills: 0 | Status: SHIPPED | OUTPATIENT
Start: 2020-03-04 | End: 2020-04-15 | Stop reason: SDUPTHER

## 2020-03-09 RX ORDER — EMPAGLIFLOZIN 10 MG/1
TABLET, FILM COATED ORAL
Qty: 90 TABLET | Refills: 0 | OUTPATIENT
Start: 2020-03-09

## 2020-03-12 ENCOUNTER — TELEPHONE (OUTPATIENT)
Dept: INTERNAL MEDICINE CLINIC | Age: 40
End: 2020-03-12

## 2020-03-19 RX ORDER — EMPAGLIFLOZIN 10 MG/1
TABLET, FILM COATED ORAL
Qty: 90 TABLET | Refills: 0 | Status: SHIPPED | OUTPATIENT
Start: 2020-03-19 | End: 2020-04-15 | Stop reason: SDUPTHER

## 2020-03-19 RX ORDER — FAMOTIDINE 20 MG/1
TABLET, FILM COATED ORAL
Qty: 60 TABLET | Refills: 0 | Status: SHIPPED | OUTPATIENT
Start: 2020-03-19 | End: 2020-04-15 | Stop reason: SDUPTHER

## 2020-04-14 LAB — HBA1C MFR BLD: 6.4 %

## 2020-04-15 ENCOUNTER — VIRTUAL VISIT (OUTPATIENT)
Dept: INTERNAL MEDICINE CLINIC | Age: 40
End: 2020-04-15
Payer: COMMERCIAL

## 2020-04-15 VITALS — WEIGHT: 238 LBS | BODY MASS INDEX: 35.15 KG/M2

## 2020-04-15 PROCEDURE — 83036 HEMOGLOBIN GLYCOSYLATED A1C: CPT | Performed by: NURSE PRACTITIONER

## 2020-04-15 PROCEDURE — 99443 PR PHYS/QHP TELEPHONE EVALUATION 21-30 MIN: CPT | Performed by: NURSE PRACTITIONER

## 2020-04-15 RX ORDER — DULAGLUTIDE 1.5 MG/.5ML
1.5 INJECTION, SOLUTION SUBCUTANEOUS WEEKLY
Qty: 2 ML | Refills: 5 | Status: SHIPPED | OUTPATIENT
Start: 2020-04-15 | End: 2020-07-15 | Stop reason: SDUPTHER

## 2020-04-15 RX ORDER — GEMFIBROZIL 600 MG/1
600 TABLET, FILM COATED ORAL 2 TIMES DAILY
Qty: 180 TABLET | Refills: 0 | Status: SHIPPED | OUTPATIENT
Start: 2020-04-15 | End: 2020-07-15 | Stop reason: SDUPTHER

## 2020-04-15 RX ORDER — ALBUTEROL SULFATE 90 UG/1
AEROSOL, METERED RESPIRATORY (INHALATION)
Qty: 18 G | Refills: 0 | Status: SHIPPED | OUTPATIENT
Start: 2020-04-15 | End: 2020-07-15 | Stop reason: SDUPTHER

## 2020-04-15 RX ORDER — GLIPIZIDE 10 MG/1
20 TABLET, FILM COATED, EXTENDED RELEASE ORAL DAILY
Qty: 180 TABLET | Refills: 0 | Status: SHIPPED | OUTPATIENT
Start: 2020-04-15 | End: 2020-07-15 | Stop reason: SDUPTHER

## 2020-04-15 RX ORDER — FAMOTIDINE 20 MG/1
TABLET, FILM COATED ORAL
Qty: 180 TABLET | Refills: 0 | Status: SHIPPED | OUTPATIENT
Start: 2020-04-15 | End: 2020-07-15 | Stop reason: ALTCHOICE

## 2020-04-15 RX ORDER — EMPAGLIFLOZIN 10 MG/1
TABLET, FILM COATED ORAL
Qty: 90 TABLET | Refills: 0 | Status: SHIPPED | OUTPATIENT
Start: 2020-04-15 | End: 2020-07-15

## 2020-06-08 ENCOUNTER — TELEPHONE (OUTPATIENT)
Dept: INTERNAL MEDICINE CLINIC | Age: 40
End: 2020-06-08

## 2020-06-09 NOTE — TELEPHONE ENCOUNTER
[facility] received a call from:    Name of Caller: Pt    Relationship to patient:Self    Organization name: n/a    Best contact number: on file    Reason for call: Pt returning call, Pt wanting to know if she has to come in office, or if she is ok to do a VV.  Pt also wants to know when to do A1C test.

## 2020-07-10 ASSESSMENT — ENCOUNTER SYMPTOMS
SHORTNESS OF BREATH: 0
CONSTIPATION: 0
ABDOMINAL PAIN: 0
DIARRHEA: 0
NAUSEA: 0
WHEEZING: 0
VOMITING: 0
COUGH: 0

## 2020-07-10 NOTE — PROGRESS NOTES
disturbance and suicidal ideas. The patient is not nervous/anxious. Allergies   Allergen Reactions    Latuda [Lurasidone Hcl] Anaphylaxis     Current Outpatient Rx   Medication Sig Dispense Refill    empagliflozin (JARDIANCE) 25 MG tablet Take 1 tablet by mouth daily 90 tablet 0    gemfibrozil (LOPID) 600 MG tablet Take 1 tablet by mouth 2 times daily 180 tablet 0    glipiZIDE (GLUCOTROL XL) 10 MG extended release tablet Take 2 tablets by mouth daily 180 tablet 0    Dulaglutide (TRULICITY) 1.5 TJ/6.7ZS SOPN Inject 1.5 mg into the skin once a week 2 mL 5    albuterol sulfate  (90 Base) MCG/ACT inhaler INHALE 2 PUFFS BY MOUTH EVERY SIX HOURS AS NEEDED FOR WHEEZING 18 g 0    folic acid-pyridoxine-cyanocobalamine (FOLTX) 2.5-25-1 MG TABS tablet Take 1 tablet by mouth daily      traZODone (DESYREL) 100 MG tablet Take 100 mg by mouth nightly 1-2 tabs nightly      paliperidone Palmitate (INVEGA TRINZA) 273 MG/0.875ML SUSP IM injection Inject 273 mg into the muscle once      clonazePAM (KLONOPIN) 0.5 MG tablet Take 0.5 mg by mouth 3 times daily as needed. [place] Rockwell ziprasidone (GEODON) 60 MG capsule Take 60 mg by mouth every morning      FLUoxetine (PROZAC) 20 MG capsule Take 20 mg by mouth daily      hydrOXYzine (VISTARIL) 50 MG capsule Take 50 mg by mouth 4 times daily as needed for Itching      ziprasidone (GEODON) 80 MG capsule Take 80 mg by mouth 2 times daily (with meals)       Patient Active Problem List   Diagnosis    Overdose of antidepressant    Cellulitis of perineum    Schizophrenia ([facility]    Diabetes mellitus ([facility]    Asthma    Hyperlipidemia      Wt Readings from Last 3 Encounters:   04/15/20 238 lb (108 kg)   01/14/20 235 lb (106.6 kg)   10/15/19 239 lb (108.4 kg)     BP Readings from Last 3 Encounters:   01/14/20 124/70   10/15/19 124/70   07/11/19 130/85     PHQ-9 Total Score: 4 (7/15/2020  1:39 PM)  Thoughts that you would be better off dead, or of hurting yourself in some way: 0 (7/15/2020  1:39 PM)    Objective/Physical Exam:  Virtual Video Exam  Patient-Reported Vitals 7/15/2020   Patient-Reported Weight 229lbs   Patient-Reported Height 5'9.5   Patient-Reported Temperature 98.1   ^no access to other VS d/t VV at this time. Physical Exam  Vitals signs reviewed. Constitutional:       General: She is not in acute distress. Appearance: She is well-developed. She is not diaphoretic. HENT:      Head: Normocephalic and atraumatic. Eyes:      Pupils: Pupils are equal, round, and reactive to light. Cardiovascular:      Rate and Rhythm: Normal rate and regular rhythm. Pulmonary:      Effort: Pulmonary effort is normal. No respiratory distress. Breath sounds: Normal breath sounds. No wheezing or rales. Chest:      Chest wall: No tenderness. Abdominal:      General: Bowel sounds are normal.      Palpations: Abdomen is soft. Skin:     General: Skin is warm and dry. Coloration: Skin is not pale. Findings: No erythema or rash. Neurological:      Mental Status: She is alert and oriented to person, place, and time. Coordination: Coordination normal.   Psychiatric:         Mood and Affect: Mood normal.     Due to this being a TeleHealth encounter, evaluation of the following organ systems is limited: Vitals/Constitutional/EENT/Resp/CV/GI//MS/Neuro/Skin/Heme-Lymph-Imm. Assessment and Plan:  Lois Mendez was seen today for diabetes and mood swings. Diagnoses and all orders for this visit:    Type 2 diabetes mellitus without complication, without long-term current use of insulin ([facility])  -     POCT glycosylated hemoglobin (Hb A1C)- 7.2%  - Lifestyle modifications such as exercise, weight loss and healthy diet encouraged and reviewed with the pt. - Increase jardiance  -     empagliflozin (JARDIANCE) 25 MG tablet; Take 1 tablet by mouth daily- increased dose  -     glipiZIDE (GLUCOTROL XL) 10 MG extended release tablet;  Take 2 tablets by mouth daily- refilled today  - if deemed necessary. Patient identification was verified at the start of the visit: Yes    Total time spent on this encounter: Not billed by time    Services were provided through a video synchronous discussion virtually to substitute for in-person clinic visit. Patient and provider were located at their individual homes. All questions answered. Pt states no further questions or concerns at this time.    Electronically signed by: Shorty Calix 07/15/20

## 2020-07-15 ENCOUNTER — VIRTUAL VISIT (OUTPATIENT)
Dept: INTERNAL MEDICINE CLINIC | Age: 40
End: 2020-07-15
Payer: COMMERCIAL

## 2020-07-15 LAB — HBA1C MFR BLD: 7.2 %

## 2020-07-15 PROCEDURE — 3051F HG A1C>EQUAL 7.0%<8.0%: CPT | Performed by: NURSE PRACTITIONER

## 2020-07-15 PROCEDURE — 96160 PT-FOCUSED HLTH RISK ASSMT: CPT | Performed by: NURSE PRACTITIONER

## 2020-07-15 PROCEDURE — 83036 HEMOGLOBIN GLYCOSYLATED A1C: CPT | Performed by: NURSE PRACTITIONER

## 2020-07-15 PROCEDURE — 99214 OFFICE O/P EST MOD 30 MIN: CPT | Performed by: NURSE PRACTITIONER

## 2020-07-15 RX ORDER — GLIPIZIDE 10 MG/1
20 TABLET, FILM COATED, EXTENDED RELEASE ORAL DAILY
Qty: 180 TABLET | Refills: 0 | Status: SHIPPED | OUTPATIENT
Start: 2020-07-15 | End: 2020-10-06 | Stop reason: SDUPTHER

## 2020-07-15 RX ORDER — GEMFIBROZIL 600 MG/1
600 TABLET, FILM COATED ORAL 2 TIMES DAILY
Qty: 180 TABLET | Refills: 0 | Status: SHIPPED | OUTPATIENT
Start: 2020-07-15 | End: 2020-10-06 | Stop reason: SDUPTHER

## 2020-07-15 RX ORDER — DULAGLUTIDE 1.5 MG/.5ML
1.5 INJECTION, SOLUTION SUBCUTANEOUS WEEKLY
Qty: 2 ML | Refills: 5 | Status: SHIPPED | OUTPATIENT
Start: 2020-07-15 | End: 2020-10-06 | Stop reason: SDUPTHER

## 2020-07-15 RX ORDER — EMPAGLIFLOZIN 25 MG/1
1 TABLET, FILM COATED ORAL DAILY
Qty: 90 TABLET | Refills: 0 | Status: SHIPPED | OUTPATIENT
Start: 2020-07-15 | End: 2020-08-27 | Stop reason: DRUGHIGH

## 2020-07-15 RX ORDER — ALBUTEROL SULFATE 90 UG/1
AEROSOL, METERED RESPIRATORY (INHALATION)
Qty: 18 G | Refills: 0 | Status: SHIPPED | OUTPATIENT
Start: 2020-07-15 | End: 2020-09-08

## 2020-07-15 ASSESSMENT — PATIENT HEALTH QUESTIONNAIRE - PHQ9
2. FEELING DOWN, DEPRESSED OR HOPELESS: 1
5. POOR APPETITE OR OVEREATING: 0
9. THOUGHTS THAT YOU WOULD BE BETTER OFF DEAD, OR OF HURTING YOURSELF: 0
8. MOVING OR SPEAKING SO SLOWLY THAT OTHER PEOPLE COULD HAVE NOTICED. OR THE OPPOSITE, BEING SO FIGETY OR RESTLESS THAT YOU HAVE BEEN MOVING AROUND A LOT MORE THAN USUAL: 0
10. IF YOU CHECKED OFF ANY PROBLEMS, HOW DIFFICULT HAVE THESE PROBLEMS MADE IT FOR YOU TO DO YOUR WORK, TAKE CARE OF THINGS AT HOME, OR GET ALONG WITH OTHER PEOPLE: 2
1. LITTLE INTEREST OR PLEASURE IN DOING THINGS: 0
4. FEELING TIRED OR HAVING LITTLE ENERGY: 3
SUM OF ALL RESPONSES TO PHQ9 QUESTIONS 1 & 2: 1
7. TROUBLE CONCENTRATING ON THINGS, SUCH AS READING THE NEWSPAPER OR WATCHING TELEVISION: 0
3. TROUBLE FALLING OR STAYING ASLEEP: 0
SUM OF ALL RESPONSES TO PHQ QUESTIONS 1-9: 4
SUM OF ALL RESPONSES TO PHQ QUESTIONS 1-9: 4
6. FEELING BAD ABOUT YOURSELF - OR THAT YOU ARE A FAILURE OR HAVE LET YOURSELF OR YOUR FAMILY DOWN: 0

## 2020-07-23 ENCOUNTER — VIRTUAL VISIT (OUTPATIENT)
Dept: INTERNAL MEDICINE CLINIC | Age: 40
End: 2020-07-23
Payer: COMMERCIAL

## 2020-07-23 VITALS — BODY MASS INDEX: 35.15 KG/M2 | HEIGHT: 69 IN

## 2020-07-23 PROCEDURE — G0438 PPPS, INITIAL VISIT: HCPCS | Performed by: NURSE PRACTITIONER

## 2020-07-23 PROCEDURE — 3051F HG A1C>EQUAL 7.0%<8.0%: CPT | Performed by: NURSE PRACTITIONER

## 2020-07-23 ASSESSMENT — PATIENT HEALTH QUESTIONNAIRE - PHQ9
SUM OF ALL RESPONSES TO PHQ QUESTIONS 1-9: 0
SUM OF ALL RESPONSES TO PHQ QUESTIONS 1-9: 0

## 2020-07-23 ASSESSMENT — LIFESTYLE VARIABLES: HOW OFTEN DO YOU HAVE A DRINK CONTAINING ALCOHOL: 0

## 2020-07-23 NOTE — PROGRESS NOTES
Medicare Annual Wellness Visit  Name: Alisha Fish Date: 2020   MRN: [id number] Sex: Female   Age: 44 y.o. Ethnicity: Non-/Non    : 1980 Race: Marion Galvez is here for Medicare AWV (no concerns/complaints )    Screenings for behavioral, psychosocial and functional/safety risks, and cognitive dysfunction are all negative except as indicated below. These results, as well as other patient data from the [address] form, are documented in Flowsheets linked to this Encounter. Allergies   Allergen Reactions    Latuda [Lurasidone Hcl] Anaphylaxis         Prior to Visit Medications    Medication Sig Taking? Authorizing Provider   empagliflozin (JARDIANCE) 25 MG tablet Take 1 tablet by mouth daily Yes ARTI Reeves CNP   gemfibrozil (LOPID) 600 MG tablet Take 1 tablet by mouth 2 times daily Yes ARTI Reeves CNP   glipiZIDE (GLUCOTROL XL) 10 MG extended release tablet Take 2 tablets by mouth daily Yes ARTI Reeves CNP   Dulaglutide (TRULICITY) 1.5 YP/6.4CJ SOPN Inject 1.5 mg into the skin once a week Yes ARTI Reeves CNP   albuterol sulfate  (90 Base) MCG/ACT inhaler INHALE 2 PUFFS BY MOUTH EVERY SIX HOURS AS NEEDED FOR WHEEZING Yes ARTI Reeves CNP   folic acid-pyridoxine-cyanocobalamine (FOLTX) 2.5-25-1 MG TABS tablet Take 1 tablet by mouth daily Yes Historical Provider, MD   traZODone (DESYREL) 100 MG tablet Take 100 mg by mouth nightly 1-2 tabs nightly Yes Historical Provider, MD   paliperidone Palmitate (INVEGA TRINZA) 273 MG/0.875ML SUSP IM injection Inject 273 mg into the muscle once Yes Historical Provider, MD   clonazePAM (KLONOPIN) 0.5 MG tablet Take 0.5 mg by mouth 3 times daily as needed. . Yes Historical Provider, MD   ziprasidone (GEODON) 60 MG capsule Take 60 mg by mouth every morning Yes Historical Provider, MD   FLUoxetine (PROZAC) 20 MG capsule Take 20 mg by mouth daily Yes Historical Provider, MD   hydrOXYzine (VISTARIL) 50 MG capsule Take 50 mg by mouth 4 times daily as needed for Itching Yes Historical Provider, MD   ziprasidone (GEODON) 80 MG capsule Take 80 mg by mouth 2 times daily (with meals) Yes Historical Provider, MD         Past Medical History:   Diagnosis Date    Asthma     Diabetes mellitus ([facility] [address]     Erb's palsy     left arm    Hyperlipidemia     Schizoaffective disorder ([facility] [address]     diag 13years old       Past Surgical History:   Procedure Laterality Date    TONSILLECTOMY AND ADENOIDECTOMY  1992    ADENOIDS         Family History   Problem Relation Age of Onset    Depression Mother         in remission    Alcohol Abuse Mother         history    Osteoporosis Mother    Fabian Arthritis Mother         RA    Other Mother         arthritis    Alcohol Abuse Father     High Blood Pressure Father     Asthma Sister         childhood    Alcohol Abuse Sister     Breast Cancer Maternal Aunt     Lung Cancer Maternal Grandmother     Alcohol Abuse Maternal Grandfather     Cancer Maternal Grandfather     Mental Illness Maternal Grandfather         undiagnosed    Colon Cancer Maternal Grandfather     Esophageal Cancer Maternal Grandfather     Alcohol Abuse Paternal Grandmother     Hearing Loss Paternal Grandmother     Stroke Paternal Grandmother     Alzheimer's Disease Paternal Grandmother     Kidney Disease Paternal Grandfather     No Known Problems Sister     Ovarian Cancer Neg Hx     Heart Attack Neg Hx        CareTeam (Including outside providers/suppliers regularly involved in providing care):   Patient Care Team:  ARTI Laura CNP as PCP - General (Nurse Practitioner)  ARTI Laura CNP as PCP - [facility] Empaneled Provider    Wt Readings from Last 3 Encounters:   04/15/20 238 lb (108 kg)   01/14/20 235 lb (106.6 kg)   10/15/19 239 lb (108.4 kg)     Patient-Reported Vitals 7/23/2020 Patient-Reported Weight 229lbs   Patient-Reported Height -   Patient-Reported Temperature -    ^no access to other VS d/t VV per pt. Body mass index is 35.15 kg/m². Based upon direct observation of the patient, evaluation of cognition reveals recent and remote memory intact. Physical Exam  Constitutional:       General: She is not in acute distress. Appearance: Normal appearance. She is not ill-appearing. Pulmonary:      Effort: Pulmonary effort is normal. No respiratory distress. Skin:     Coloration: Skin is not jaundiced or pale. Neurological:      Mental Status: She is alert. Comments: No facial asymmetry noted   Psychiatric:         Mood and Affect: Mood normal.       Patient's complete Health Risk Assessment and screening values have been reviewed and are found in Flowsheets. The following problems were reviewed today and where indicated follow up appointments were made and/or referrals ordered. Positive Risk Factor Screenings with Interventions:     Substance Abuse:  Social History     Tobacco History     Smoking Status  Current Every Day Smoker Smoking Start Date  4/10/1997 Smoking Frequency  1.5 packs/day for 23 years (34.5 pk yrs) Smoking Tobacco Type  Cigarettes    Smokeless Tobacco Use  Never Used          Alcohol History     Alcohol Use Status  Not Currently          Drug Use     Drug Use Status  No Comment  history of marijuana (last 5 years ago) and cocaine use (last in her 25s)          Sexual Activity     Sexually Active  Not Currently Partners  Male               Audit Questionnaire: Screen for Alcohol Misuse  How often do you have a drink containing alcohol?: Never  Substance Abuse Interventions:  · Tobacco abuse:  tobacco cessation tips and resources provided    General Health:  General  In general, how would you say your health is?: Fair  In the past 7 days, have you experienced any of the following?  New or Increased Pain, New or Increased Fatigue, Loneliness, Social 05/29/2019    Diabetic foot exam  01/17/2020    Diabetic microalbuminuria test  03/28/2020    Lipid screen  03/28/2020    Flu vaccine (1) 09/01/2020    A1C test (Diabetic or Prediabetic)  07/15/2021    Cervical cancer screen  06/07/2022    DTaP/Tdap/Td vaccine (3 - Td) 04/18/2029    Pneumococcal 0-64 years Vaccine  Completed    HIV screen  Completed    Hepatitis A vaccine  Aged Out    Hib vaccine  Aged Out    Meningococcal (ACWY) vaccine  Aged Out     Recommendations for [employer] Due: see orders and patient instructions/AVS.    Recommended screening schedule for the next 5-10 years is provided to the patient in written form: see Patient Analy Barcenas was seen today for medicare aw. Diagnoses and all orders for this visit:    Routine general medical examination at a health care facility   - Fasting labs reordered   - Smoking cessation recommended   - Recommended patient have a diabetic eye exam   - Lifestyle modifications such as exercise, weight loss and healthy diet encouraged and reviewed with the pt. Deidra Harrison is a 44 y.o. female being evaluated by a Virtual Visit (video and audio) encounter to address concerns as mentioned above. A caregiver was present when appropriate. Due to this being a TeleHealth encounter (During FTVXV-69 public health emergency), evaluation of the following organ systems was limited: Vitals/Constitutional/EENT/Resp/CV/GI//MS/Neuro/Skin/Heme-Lymph-Imm. Pursuant to the emergency declaration under the [facility], [address] [place] [address] authority and the [employer] Act, this Virtual Visit was conducted with patient's (and/or legal guardian's) consent, to reduce the patient's risk of exposure to COVID-19 and provide necessary medical care.   The patient (and/or legal guardian) has also been advised to contact this office for worsening conditions or problems, and seek emergency medical treatment and/or call 911 if deemed necessary. Patient identification was verified at the start of the visit: Yes    Services were provided through a video synchronous discussion virtually to substitute for in-person clinic visit. Patient and provider were located at their individual homes. --ARTI Abarca CNP on 7/23/2020 at 3:19 PM    An electronic signature was used to authenticate this note.

## 2020-07-23 NOTE — PATIENT INSTRUCTIONS
Personalized Preventive Plan for Araceli Robertson - 7/23/2020  Medicare offers a range of preventive health benefits. Some of the tests and screenings are paid in full while other may be subject to a deductible, co-insurance, and/or copay. Some of these benefits include a comprehensive review of your medical history including lifestyle, illnesses that may run in your family, and various assessments and screenings as appropriate. After reviewing your medical record and screening and assessments performed today your provider may have ordered immunizations, labs, imaging, and/or referrals for you. A list of these orders (if applicable) as well as your Preventive Care list are included within your After Visit Summary for your review. Other Preventive Recommendations:    · A preventive eye exam performed by an eye specialist is recommended every 1-2 years to screen for glaucoma; cataracts, macular degeneration, and other eye disorders. · A preventive dental visit is recommended every 6 months. · Try to get at least 150 minutes of exercise per week or 10,000 steps per day on a pedometer . · Order or download the FREE \"Exercise & Physical Activity: Your Everyday Guide\" from [employer]. Call 3-814.978.9222 or search [employer] online. · You need 1389-7400 mg of calcium and 0126-4218 IU of vitamin D per day. It is possible to meet your calcium requirement with diet alone, but a vitamin D supplement is usually necessary to meet this goal.  · When exposed to the sun, use a sunscreen that protects against both UVA and UVB radiation with an SPF of 30 or greater. Reapply every 2 to 3 hours or after sweating, drying off with a towel, or swimming. · Always wear a seat belt when traveling in a car. Always wear a helmet when riding a bicycle or motorcycle.

## 2020-08-26 ENCOUNTER — NURSE TRIAGE (OUTPATIENT)
Dept: OTHER | Facility: CLINIC | Age: 40
End: 2020-08-26

## 2020-08-26 NOTE — TELEPHONE ENCOUNTER
Tongue is only swollen slightly this has been happening off and on x1mo. Scheduled for appt with Linda Muniz tomorrow. Pt does not feel this is an emergency.

## 2020-08-26 NOTE — TELEPHONE ENCOUNTER
[facility]) called to nurse access line. Patient is having difficulty swallowing. Patient assessment:  Patient states:    Started an increased dose of Jardiance, stated two months ago. Then started having:     Swallowing problems,  dry cough, headache,   itchy,     Difficulty with swallowing saliva. No problem with swallowing food. Though it may be allergies and was treating with benadryl. That seemed to help but doesn't take every day. And benedryl wears off quickly. Uses an albuterol inhaler. Is a smoker. Has had an anaphylaxis episode in the past she said she can tell something is wrong. States she  runs out of air when she is talking but she states it may be due to smoking. Has a hacking (dry) cough. . Sore throat from coughing. Tongue may be a little swollen, No chest pain. No chance she could be pregnant    Diabetic     Protocol recommendation: Go to ED but patient refused and will agree to a virtual visit with MD.     Soft call transfer to [facility].        Reason for Disposition   [1] Severe difficulty swallowing (e.g., drooling or spitting) AND [2] started suddenly after taking a medicine or allergic food    Protocols used: SWALLOWING DIFFICULTY-ADULT-

## 2020-08-27 ENCOUNTER — VIRTUAL VISIT (OUTPATIENT)
Dept: INTERNAL MEDICINE CLINIC | Age: 40
End: 2020-08-27
Payer: COMMERCIAL

## 2020-08-27 PROCEDURE — 3051F HG A1C>EQUAL 7.0%<8.0%: CPT | Performed by: NURSE PRACTITIONER

## 2020-08-27 PROCEDURE — 99213 OFFICE O/P EST LOW 20 MIN: CPT | Performed by: NURSE PRACTITIONER

## 2020-08-27 RX ORDER — EMPAGLIFLOZIN 10 MG/1
1 TABLET, FILM COATED ORAL DAILY
Qty: 90 TABLET | Refills: 0 | Status: SHIPPED | OUTPATIENT
Start: 2020-08-27 | End: 2020-10-06

## 2020-08-27 ASSESSMENT — ENCOUNTER SYMPTOMS
ROS SKIN COMMENTS: ITCHING
COUGH: 1
TROUBLE SWALLOWING: 1

## 2020-08-27 NOTE — PROGRESS NOTES
2020    TELEHEALTH EVALUATION -- Audio/Visual (During ULHQD-28 public health emergency)    HPI:    Deidra aHrrison (:  1980) has requested an audio/video evaluation for the following concern(s):    Since increasing dose of Jardiance she reports itching all over, dry cough, trouble swallowing. Symptoms for about 1 month. Did not have symptoms with lower dose. She stopped the medication and symptoms better. Review of Systems   Constitutional: Negative. HENT: Positive for trouble swallowing. Respiratory: Positive for cough. Skin:        itching   All other systems reviewed and are negative. Prior to Visit Medications    Medication Sig Taking? Authorizing Provider   empagliflozin (JARDIANCE) 10 MG tablet Take 1 tablet by mouth daily Yes ARTI Amador CNP   gemfibrozil (LOPID) 600 MG tablet Take 1 tablet by mouth 2 times daily  ARTI Jacobson CNP   glipiZIDE (GLUCOTROL XL) 10 MG extended release tablet Take 2 tablets by mouth daily  ARTI Jacosbon CNP   Dulaglutide (TRULICITY) 1.5 TD/4.6GK SOPN Inject 1.5 mg into the skin once a week  ARTI Jacobson CNP   albuterol sulfate  (90 Base) MCG/ACT inhaler INHALE 2 PUFFS BY MOUTH EVERY SIX HOURS AS NEEDED FOR WHEEZING  ARTI Jacobson CNP   folic acid-pyridoxine-cyanocobalamine (FOLTX) 2.5-25-1 MG TABS tablet Take 1 tablet by mouth daily  Historical Provider, MD   traZODone (DESYREL) 100 MG tablet Take 100 mg by mouth nightly 1-2 tabs nightly  Historical Provider, MD   paliperidone Palmitate (INVEGA TRINZA) 273 MG/0.875ML SUSP IM injection Inject 273 mg into the muscle once  Historical Provider, MD   clonazePAM (KLONOPIN) 0.5 MG tablet Take 0.5 mg by mouth 3 times daily as needed. Edwardo Veloz   Historical Provider, MD   ziprasidone (GEODON) 60 MG capsule Take 60 mg by mouth every morning  Historical Provider, MD   FLUoxetine (PROZAC) 20 MG capsule Take 20 mg by mouth daily Historical Provider, MD   hydrOXYzine (VISTARIL) 50 MG capsule Take 50 mg by mouth 4 times daily as needed for Itching  Historical Provider, MD   ziprasidone (GEODON) 80 MG capsule Take 80 mg by mouth 2 times daily (with meals)  Historical Provider, MD       Social History     Tobacco Use    Smoking status: Current Every Day Smoker     Packs/day: 1.50     Years: 23.00     Pack years: 34.50     Types: Cigarettes     Start date: 4/10/1997    Smokeless tobacco: Never Used   Substance Use Topics    Alcohol use: Not Currently    Drug use: No     Types: Marijuana     Comment: history of marijuana (last 5 years ago) and cocaine use (last in her 25s)            PHYSICAL EXAMINATION:  [ INSTRUCTIONS:  \"[x]\" Indicates a positive item  \"[]\" Indicates a negative item  -- DELETE ALL ITEMS NOT EXAMINED]  Vital Signs: (As obtained by patient/caregiver or practitioner observation)    Blood pressure-  Heart rate-    Respiratory rate-    Temperature-  Pulse oximetry-     Constitutional: [x] Appears well-developed and well-nourished [] No apparent distress      [] Abnormal-   Mental status  [x] Alert and awake  [x] Oriented to person/place/time [x]Able to follow commands      Eyes:  EOM    [x]  Normal  [] Abnormal-  Sclera  [x]  Normal  [] Abnormal -         Discharge [x]  None visible  [] Abnormal -    HENT:   [x] Normocephalic, atraumatic.   [] Abnormal   [x] Mouth/Throat: Mucous membranes are moist.     External Ears [x] Normal  [] Abnormal-     Neck: [x] No visualized mass     Pulmonary/Chest: [x] Respiratory effort normal.  [x] No visualized signs of difficulty breathing or respiratory distress        [] Abnormal-      Musculoskeletal:   [x] Normal gait with no signs of ataxia         [x] Normal range of motion of neck        [] Abnormal-       Neurological:        [x] No Facial Asymmetry (Cranial nerve 7 motor function) (limited exam to video visit)          [x] No gaze palsy        [] Abnormal-         Skin:        [x] No in-person clinic visit. Patient and provider were located at their individual homes. --ARTI Michelle CNP on 8/27/2020 at 8:26 AM    An electronic signature was used to authenticate this note.

## 2020-09-08 RX ORDER — ALBUTEROL SULFATE 90 UG/1
AEROSOL, METERED RESPIRATORY (INHALATION)
Qty: 18 G | Refills: 0 | Status: SHIPPED | OUTPATIENT
Start: 2020-09-08 | End: 2020-10-06 | Stop reason: SDUPTHER

## 2020-09-30 DIAGNOSIS — E11.9 TYPE 2 DIABETES MELLITUS WITHOUT COMPLICATION, WITHOUT LONG-TERM CURRENT USE OF INSULIN (HCC): ICD-10-CM

## 2020-09-30 DIAGNOSIS — E78.2 MIXED HYPERLIPIDEMIA: ICD-10-CM

## 2020-09-30 DIAGNOSIS — F25.0 SCHIZOAFFECTIVE DISORDER, BIPOLAR TYPE (HCC): ICD-10-CM

## 2020-09-30 LAB
A/G RATIO: 1.4 (ref 1.1–2.2)
ALBUMIN SERPL-MCNC: 4.6 G/DL (ref 3.4–5)
ALP BLD-CCNC: 81 U/L (ref 40–129)
ALT SERPL-CCNC: 19 U/L (ref 10–40)
ANION GAP SERPL CALCULATED.3IONS-SCNC: 14 MMOL/L (ref 3–16)
AST SERPL-CCNC: 17 U/L (ref 15–37)
BASOPHILS ABSOLUTE: 0.1 K/UL (ref 0–0.2)
BASOPHILS RELATIVE PERCENT: 0.8 %
BILIRUB SERPL-MCNC: <0.2 MG/DL (ref 0–1)
BUN BLDV-MCNC: 15 MG/DL (ref 7–20)
CALCIUM SERPL-MCNC: 9.4 MG/DL (ref 8.3–10.6)
CHLORIDE BLD-SCNC: 98 MMOL/L (ref 99–110)
CHOLESTEROL, FASTING: 224 MG/DL (ref 0–199)
CO2: 24 MMOL/L (ref 21–32)
CREAT SERPL-MCNC: 0.7 MG/DL (ref 0.6–1.1)
CREATININE URINE: 287.6 MG/DL (ref 28–259)
EOSINOPHILS ABSOLUTE: 0.4 K/UL (ref 0–0.6)
EOSINOPHILS RELATIVE PERCENT: 2.5 %
GFR AFRICAN AMERICAN: >60
GFR NON-AFRICAN AMERICAN: >60
GLOBULIN: 3.2 G/DL
GLUCOSE BLD-MCNC: 168 MG/DL (ref 70–99)
HCT VFR BLD CALC: 42.2 % (ref 36–48)
HDLC SERPL-MCNC: 36 MG/DL (ref 40–60)
HEMOGLOBIN: 14.1 G/DL (ref 12–16)
LDL CHOLESTEROL CALCULATED: 158 MG/DL
LYMPHOCYTES ABSOLUTE: 4.2 K/UL (ref 1–5.1)
LYMPHOCYTES RELATIVE PERCENT: 29.8 %
MCH RBC QN AUTO: 28.3 PG (ref 26–34)
MCHC RBC AUTO-ENTMCNC: 33.4 G/DL (ref 31–36)
MCV RBC AUTO: 84.7 FL (ref 80–100)
MICROALBUMIN UR-MCNC: 19.3 MG/DL
MICROALBUMIN/CREAT UR-RTO: 67.1 MG/G (ref 0–30)
MONOCYTES ABSOLUTE: 1.1 K/UL (ref 0–1.3)
MONOCYTES RELATIVE PERCENT: 7.8 %
NEUTROPHILS ABSOLUTE: 8.4 K/UL (ref 1.7–7.7)
NEUTROPHILS RELATIVE PERCENT: 59.1 %
PDW BLD-RTO: 13 % (ref 12.4–15.4)
PLATELET # BLD: 293 K/UL (ref 135–450)
PMV BLD AUTO: 8.8 FL (ref 5–10.5)
POTASSIUM SERPL-SCNC: 4.1 MMOL/L (ref 3.5–5.1)
RBC # BLD: 4.98 M/UL (ref 4–5.2)
SODIUM BLD-SCNC: 136 MMOL/L (ref 136–145)
TOTAL PROTEIN: 7.8 G/DL (ref 6.4–8.2)
TRIGLYCERIDE, FASTING: 148 MG/DL (ref 0–150)
TSH REFLEX FT4: 1.08 UIU/ML (ref 0.27–4.2)
VLDLC SERPL CALC-MCNC: 30 MG/DL
WBC # BLD: 14.2 K/UL (ref 4–11)

## 2020-10-05 ASSESSMENT — ENCOUNTER SYMPTOMS
ABDOMINAL PAIN: 0
VOMITING: 0
COUGH: 0
NAUSEA: 0
CONSTIPATION: 0
DIARRHEA: 0
WHEEZING: 0
SHORTNESS OF BREATH: 0

## 2020-10-06 ENCOUNTER — OFFICE VISIT (OUTPATIENT)
Dept: INTERNAL MEDICINE CLINIC | Age: 40
End: 2020-10-06
Payer: COMMERCIAL

## 2020-10-06 VITALS
OXYGEN SATURATION: 99 % | TEMPERATURE: 96.6 F | SYSTOLIC BLOOD PRESSURE: 128 MMHG | WEIGHT: 235 LBS | BODY MASS INDEX: 34.7 KG/M2 | DIASTOLIC BLOOD PRESSURE: 82 MMHG | HEART RATE: 100 BPM

## 2020-10-06 LAB — HBA1C MFR BLD: 7.8 %

## 2020-10-06 PROCEDURE — 99214 OFFICE O/P EST MOD 30 MIN: CPT | Performed by: NURSE PRACTITIONER

## 2020-10-06 PROCEDURE — 3051F HG A1C>EQUAL 7.0%<8.0%: CPT | Performed by: NURSE PRACTITIONER

## 2020-10-06 PROCEDURE — 90686 IIV4 VACC NO PRSV 0.5 ML IM: CPT | Performed by: NURSE PRACTITIONER

## 2020-10-06 PROCEDURE — G0008 ADMIN INFLUENZA VIRUS VAC: HCPCS | Performed by: NURSE PRACTITIONER

## 2020-10-06 RX ORDER — CANAGLIFLOZIN 100 MG/1
100 TABLET, FILM COATED ORAL
Qty: 90 TABLET | Refills: 0 | Status: SHIPPED | OUTPATIENT
Start: 2020-10-06 | End: 2020-10-21

## 2020-10-06 RX ORDER — GEMFIBROZIL 600 MG/1
600 TABLET, FILM COATED ORAL 2 TIMES DAILY
Qty: 180 TABLET | Refills: 0 | Status: SHIPPED | OUTPATIENT
Start: 2020-10-06 | End: 2020-12-22 | Stop reason: SDUPTHER

## 2020-10-06 RX ORDER — ALBUTEROL SULFATE 90 UG/1
AEROSOL, METERED RESPIRATORY (INHALATION)
Qty: 18 G | Refills: 0 | Status: SHIPPED | OUTPATIENT
Start: 2020-10-06 | End: 2020-12-18 | Stop reason: SDUPTHER

## 2020-10-06 RX ORDER — GLIPIZIDE 10 MG/1
20 TABLET, FILM COATED, EXTENDED RELEASE ORAL DAILY
Qty: 180 TABLET | Refills: 0 | Status: SHIPPED | OUTPATIENT
Start: 2020-10-06 | End: 2020-12-22 | Stop reason: SDUPTHER

## 2020-10-06 RX ORDER — LISINOPRIL 2.5 MG/1
2.5 TABLET ORAL DAILY
Qty: 90 TABLET | Refills: 0 | Status: SHIPPED | OUTPATIENT
Start: 2020-10-06 | End: 2020-10-22 | Stop reason: SINTOL

## 2020-10-06 RX ORDER — DULAGLUTIDE 1.5 MG/.5ML
1.5 INJECTION, SOLUTION SUBCUTANEOUS WEEKLY
Qty: 2 ML | Refills: 5 | Status: SHIPPED | OUTPATIENT
Start: 2020-10-06 | End: 2020-12-22 | Stop reason: SDUPTHER

## 2020-10-06 NOTE — PATIENT INSTRUCTIONS
Start lisinopril daily for kidney protection  Add invokana for diabetes. Patient Education   canagliflozin  Pronunciation:  DWAYNE mas nighat VIVEROSJUSTIN zin  Brand:  Invokana  What is the most important information I should know about canagliflozin? You should not use canagliflozin if you have severe kidney disease or if you are on dialysis. Canagliflozin may increase your risk of lower leg amputation,  especially if you have had a prior amputation, a foot ulcer, heart disease, circulation problems, or nerve damage. Canagliflozin can cause serious infections in the penis or vagina. Get medical help right away if you have burning, itching, odor, discharge, pain, tenderness, redness or swelling of the genital or rectal area, fever, or if you don't feel well. What is canagliflozin? Canagliflozin is an oral diabetes medicine that helps control blood sugar levels. Canagliflozin works by helping the kidneys get rid of glucose from your bloodstream.  Canagliflozin is used together with diet and exercise to improve blood sugar control in adults with type 2 diabetes mellitus. Canagliflozin is not for treating type 1 diabetes. Canagliflozin may also be used for purposes not listed in this medication guide. What should I discuss with my healthcare provider before taking canagliflozin? You should not use canagliflozin if you are allergic to it, or if you have:  · severe kidney disease (or if you are on dialysis). Canagliflozin may increase your risk of lower leg amputation,  especially if you have had a prior amputation, a foot ulcer, heart disease, circulation problems, or nerve damage.   Tell your doctor if you have ever had:  · heart problems;  · a diabetic foot ulcer or amputation;  · circulation problems or nerve problems in your legs or feet;  · kidney disease;  · liver disease;  · bladder infections or other urination problems;  · a pancreas disorder;  · diabetic ketoacidosis; or  · if you are on a low salt diet.  Follow your doctor's instructions about using this medicine if you are pregnant. Blood sugar control is very important during pregnancy, and your dose needs may be different during each trimester. You should not use canagliflozin during the second or third trimester of pregnancy. You should not breast-feed while using this medicine. Canagliflozin is not approved for use by anyone younger than 25years old. How should I take canagliflozin? Follow all directions on your prescription label and read all medication guides or instruction sheets. Your doctor may occasionally change your dose. Use the medicine exactly as directed. Canagliflozin is usually taken once per day, before the first meal of the day. You may have very low blood pressure while taking this medicine. Call your doctor if you are sick with vomiting or diarrhea, or if you are sweating more than usual. Drink plenty of liquids while you are taking canagliflozin. Low blood sugar (hypoglycemia) can happen to everyone who has diabetes. Symptoms include headache, hunger, sweating, irritability, dizziness, nausea, fast heart rate, and feeling anxious or shaky. To quickly treat low blood sugar, always keep a fast-acting source of sugar with you such as fruit juice, hard candy, crackers, raisins, or non-diet soda. Your doctor can prescribe a glucagon emergency injection kit to use in case you have severe hypoglycemia and cannot eat or drink. Be sure your family and close friends know how to give you this injection in an emergency. Also watch for signs of high blood sugar (hyperglycemia) such as increased thirst or urination, blurred vision, headache, and tiredness. Blood sugar levels can be affected by stress, illness, surgery, exercise, alcohol use, or skipping meals. Ask your doctor before changing your dose or medication schedule.   Canagliflozin is only part of a complete treatment program that may also include diet, exercise, weight control, blood sugar testing, and special medical care. Follow your doctor's instructions very closely. This medicine can affect the results of certain medical tests. Tell any doctor who treats you that you are using canagliflozin. Store at room temperature away from moisture and heat. What happens if I miss a dose? Take the medicine as soon as you can, but skip the missed dose if it is almost time for your next dose. Do not take two doses at one time. What happens if I overdose? Seek emergency medical attention or call the Poison Help line at 1-724.182.2016. What should I avoid while taking canagliflozin? Avoid getting up too fast from a sitting or lying position, or you may feel dizzy. What are the possible side effects of canagliflozin? Get emergency medical help if you have signs of an allergic reaction: hives; difficult breathing; swelling of your face, lips, tongue, or throat. Seek medical attention right away if you have signs of a genital infection (penis or vagina): burning, itching, odor, discharge, pain, tenderness, redness or swelling of the genital or rectal area, fever, not feeling well. These symptoms may get worse quickly. Call your doctor at once if you have:  · a light-headed feeling, like you might pass out;  · little or no urination;  · pain or burning when you urinate;  · new pain, tenderness, sores, ulcers, or infections in your legs or feet;  · high potassium --nausea, irregular heartbeats, weakness, loss of movement;  · ketoacidosis (too much acid in the blood) --nausea, vomiting, stomach pain, confusion, unusual drowsiness, or trouble breathing; or  · dehydration symptoms --dizziness, weakness, feeling light-headed (like you might pass out). You may be more likely to have a broken bone while using canagliflozin. Talk with your doctor about how to avoid the risk of fractures. Side effects may be more likely to occur in older adults.   Common side effects may include:  · genital called sacubitril; or  · are allergic to any other ACE inhibitor, such as benazepril, captopril, enalapril, fosinopril, moexipril, perindopril, quinapril, ramipril, or trandolapril. Do not take lisinopril within 36 hours before or after taking medicine that contains sacubitril (such as Entresto). If you have diabetes, do not use lisinopril together with any medication that contains aliskiren (a blood pressure medicine). You may also need to avoid taking lisinopril with aliskiren if you have kidney disease. Tell your doctor if you have ever had:  · kidney disease (or if you are on dialysis);  · liver disease; or  · high levels of potassium in your blood. Do not use if you are pregnant, and tell your doctor right away if you become pregnant. Lisinopril can cause injury or death to the unborn baby if you take the medicine during your second or third trimester. You should not breastfeed while using this medicine. How should I take lisinopril? Follow all directions on your prescription label and read all medication guides or instruction sheets. Your doctor may occasionally change your dose. Use the medicine exactly as directed. Drink plenty of water each day while you are taking this medicine. Lisinopril can be taken with or without food. Measure liquid medicine carefully. Use the dosing syringe provided, or use a medicine dose-measuring device (not a kitchen spoon). Your blood pressure will need to be checked often. Your kidney function and electrolytes may also need to be checked. Call your doctor if you are sick with vomiting or diarrhea, or if you are sweating more than usual. You can easily become dehydrated while taking lisinopril. This can lead to very low blood pressure, a serious electrolyte imbalance, or kidney failure. If you need surgery, tell the surgeon ahead of time that you are using lisinopril. If you have high blood pressure, keep using this medicine even if you feel well.  High blood pressure often has no symptoms. You may need to use blood pressure medicine for the rest of your life. Store at room temperature away from moisture and heat. Do not freeze the oral liquid. What happens if I miss a dose? Take the medicine as soon as you can, but skip the missed dose if it is almost time for your next dose. Do not take two doses at one time. What happens if I overdose? Seek emergency medical attention or call the Poison Help line at 1-163.902.2963. What should I avoid while taking lisinopril? Drinking alcohol can further lower your blood pressure and may increase certain side effects of lisinopril. Avoid becoming overheated or dehydrated during exercise, in hot weather, or by not drinking enough fluids. Lisinopril can decrease sweating and you may be more prone to heat stroke. Do not use potassium supplements or salt substitutes, unless your doctor has told you to. Avoid getting up too fast from a sitting or lying position, or you may feel dizzy. What are the possible side effects of lisinopril? Get emergency medical help if you have signs of an allergic reaction: hives; severe stomach pain; difficulty breathing; swelling of your face, lips, tongue, or throat. You may be more likely to have an allergic reaction if you are -American. Call your doctor at once if you have:  · a light-headed feeling, like you might pass out;  · fever, sore throat;  · high potassium --nausea, weakness, tingly feeling, chest pain, irregular heartbeats, loss of movement;  · kidney problems --little or no urination, swelling in your feet or ankles, feeling tired or short of breath; or  · liver problems --nausea, upper stomach pain, itching, tired feeling, loss of appetite, dark urine, eddie-colored stools, jaundice (yellowing of the skin or eyes). Common side effects may include:  · headache, dizziness;  · cough; or  · chest pain. This is not a complete list of side effects and others may occur.  Call your doctor for medical advice about side effects. You may report side effects to FDA at 8-517-FDA-8376. What other drugs will affect lisinopril? Tell your doctor about all your other medicines, especially:  · a diuretic or \"water pill\";  · lithium;  · gold injections to treat arthritis;  · insulin or oral diabetes medicine;  · a potassium supplement;  · medicine to prevent organ transplant rejection --everolimus, sirolimus, tacrolimus, temsirolimus; or  · NSAIDs (nonsteroidal anti-inflammatory drugs) --aspirin, ibuprofen (Advil, Motrin), naproxen (Aleve), celecoxib, diclofenac, indomethacin, meloxicam, and others. This list is not complete. Other drugs may affect lisinopril, including prescription and over-the-counter medicines, vitamins, and herbal products. Not all possible drug interactions are listed here. Where can I get more information? Your pharmacist can provide more information about lisinopril. Remember, keep this and all other medicines out of the reach of children, never share your medicines with others, and use this medication only for the indication prescribed. Every effort has been made to ensure that the information provided by [address] is accurate, up-to-date, and complete, but no guarantee is made to that effect. Drug information contained herein may be time sensitive. [employer] information has been compiled for use by healthcare practitioners and consumers in the [place] and therefore [employer] does not warrant that uses outside of the [place] are appropriate, unless specifically indicated otherwise. [facility]'s drug information does not endorse drugs, diagnose patients or recommend therapy.  [facility] drug information is an informational resource designed to assist licensed healthcare practitioners in caring for their patients and/or to serve consumers viewing this service as a supplement to, and not a substitute for, the expertise, skill, knowledge and judgment of healthcare practitioners. The absence of a warning for a given drug or drug combination in no way should be construed to indicate that the drug or drug combination is safe, effective or appropriate for any given patient. [facility] does not assume any responsibility for any aspect of healthcare administered with the aid of information [facility] provides. The information contained herein is not intended to cover all possible uses, directions, precautions, warnings, drug interactions, allergic reactions, or adverse effects. If you have questions about the drugs you are taking, check with your doctor, nurse or pharmacist.  Copyright 1742-6435 [address]: 15.03. Revision date: 10/22/2019. Care instructions adapted under license by [facility] ([facility]). If you have questions about a medical condition or this instruction, always ask your healthcare professional. [place] [address] any warranty or liability for your use of this information.

## 2020-10-06 NOTE — PROGRESS NOTES
Office Visit  10/6/2020    Subjective:  Chief Complaint   Patient presents with    Diabetes     a1c today      HPI:  Adwoa Zuniga is a 36 y.o. female who presents to the clinic today for follow up. DM- Prescribed Glipizide ER 20 mg daily and Trulicity 1.5 mg SQ weekly, and Jardiance 10 mg daily was added. She reports that she had hair loss and itching and \"had to think about swallowing\" when she was on Jardiance. She states that she stopped this medication and those symptoms resolved. No hypoglycemic events. She does not take her blood sugar at home. Asymptomatic. Metformin caused \"explosive diarrhea. \"        Asthma- childhood asthma per pt.  Uses albuterol as needed with relief- using 0-3x/week. No trouble breathing/SOB/wheezing. PFT ordered- not completed.     Hyperlipidemia-the patient is taking gemfibrozil 600 mg by mouth twice daily without side effects.  She would like to continue this medication at this time.      Mood- Continues to follow with Joshua Arzola. Following with psychiatry and psychology. Stable at this time per pt. Denies SI/HI.     Smoker- Continues to smoke 1 ppd. Not ready to quit at this time.     Review of Systems   Constitutional: Negative for chills, fatigue, fever and unexpected weight change. Eyes: Negative for visual disturbance. Respiratory: Negative for cough, shortness of breath and wheezing. Cardiovascular: Negative for chest pain, palpitations and leg swelling. Gastrointestinal: Negative for abdominal pain, constipation, diarrhea, nausea and vomiting. Skin: Negative for pallor and rash. Neurological: Negative for dizziness, weakness, light-headedness, numbness and headaches. Psychiatric/Behavioral: Negative for dysphoric mood, self-injury, sleep disturbance and suicidal ideas. The patient is not nervous/anxious.       Allergies   Allergen Reactions    Andrew Payne [Lurasidone Hcl] Anaphylaxis     Current Outpatient Rx   Medication Sig Dispense Refill    canagliflozin (INVOKANA) 100 MG TABS tablet Take 1 tablet by mouth every morning (before breakfast) 90 tablet 0    lisinopril (PRINIVIL;ZESTRIL) 2.5 MG tablet Take 1 tablet by mouth daily 90 tablet 0    gemfibrozil (LOPID) 600 MG tablet Take 1 tablet by mouth 2 times daily 180 tablet 0    Dulaglutide (TRULICITY) 1.5 WI/1.4VG SOPN Inject 1.5 mg into the skin once a week 2 mL 5    glipiZIDE (GLUCOTROL XL) 10 MG extended release tablet Take 2 tablets by mouth daily 180 tablet 0    albuterol sulfate  (90 Base) MCG/ACT inhaler INHALE 2 PUFFS BY MOUTH EVERY SIX HOURS AS NEEDED FOR WHEEZING 18 g 0    folic acid-pyridoxine-cyanocobalamine (FOLTX) 2.5-25-1 MG TABS tablet Take 1 tablet by mouth daily      traZODone (DESYREL) 100 MG tablet Take 100 mg by mouth nightly 1-2 tabs nightly      paliperidone Palmitate (INVEGA TRINZA) 273 MG/0.875ML SUSP IM injection Inject 273 mg into the muscle once      clonazePAM (KLONOPIN) 0.5 MG tablet Take 0.5 mg by mouth 3 times daily as needed. [place] [facility]ar[place] ziprasidone (GEODON) 60 MG capsule Take 60 mg by mouth every morning      FLUoxetine (PROZAC) 20 MG capsule Take 20 mg by mouth daily      hydrOXYzine (VISTARIL) 50 MG capsule Take 50 mg by mouth 4 times daily as needed for Itching      ziprasidone (GEODON) 80 MG capsule Take 80 mg by mouth 2 times daily (with meals)       Patient Active Problem List   Diagnosis    Overdose of antidepressant    Schizophrenia ([facility]    Diabetes mellitus ([facility]    Asthma    Hyperlipidemia      Wt Readings from Last 3 Encounters:   10/06/20 235 lb (106.6 kg)   04/15/20 238 lb (108 kg)   01/14/20 235 lb (106.6 kg)     BP Readings from Last 3 Encounters:   10/06/20 128/82   01/14/20 124/70   10/15/19 124/70     The 10-year ASCVD risk score (Chika Farooq, et al., 2013) is: 12.7%    Values used to calculate the score:      Age: 36 years      Sex: Female      Is Non- : No      Diabetic: Yes      Tobacco smoker:  Yes Systolic Blood Pressure: 355 mmHg      Is BP treated: No      HDL Cholesterol: 36 mg/dL      Total Cholesterol: 224 mg/dL    Objective/Physical Exam:  /82   Pulse 100   Temp 96.6 °F (35.9 °C) (Temporal)   Wt 235 lb (106.6 kg)   SpO2 99%   BMI 34.70 kg/m²   Body mass index is 34.7 kg/m². Physical Exam  Vitals signs reviewed. Constitutional:       General: She is not in acute distress. Appearance: She is well-developed. She is not diaphoretic. HENT:      Head: Normocephalic and atraumatic. Eyes:      Pupils: Pupils are equal, round, and reactive to light. Cardiovascular:      Rate and Rhythm: Normal rate and regular rhythm. Pulmonary:      Effort: Pulmonary effort is normal. No respiratory distress. Breath sounds: Normal breath sounds. No wheezing or rales. Chest:      Chest wall: No tenderness. Abdominal:      General: Bowel sounds are normal.      Palpations: Abdomen is soft. Musculoskeletal:      Comments: Diabetic foot exam: 2+ DP and PT pulses. Sensation intact to monofilament testing. No callous or ulcers visualized; first and fifth toenails fungal in appearance bilaterally- the rest of the toenails are normal in appearance, proprioception intact in the bilateral feet   Skin:     General: Skin is warm and dry. Coloration: Skin is not pale. Findings: No erythema or rash. Neurological:      Mental Status: She is alert and oriented to person, place, and time. Coordination: Coordination normal.   Psychiatric:         Mood and Affect: Mood normal.       Assessment and Plan:  Lakhwinder Villa was seen today for diabetes. Diagnoses and all orders for this visit:    Type 2 diabetes mellitus without complication, without long-term current use of insulin ([facility])  -     POCT glycosylated hemoglobin (Hb A1C)-7.8%  - Not well controlled. Patient had stopped Jardiance on her own due to side effects. Reviewed options. She is agreeable to trying Invokana.    -     canagliflozin (INVOKANA) 100 MG TABS tablet; Take 1 tablet by mouth every morning (before breakfast) - patient education handout provided and reviewed with the pt. She will call if side effects occur.  -     Dulaglutide (TRULICITY) 1.5 DN/9.4GN SOPN; Inject 1.5 mg into the skin once a week-refilled today  -     glipiZIDE (GLUCOTROL XL) 10 MG extended release tablet; Take 2 tablets by mouth daily-refilled today  -     Diabetic Foot Exam-see physical exam  - Lifestyle modifications such as exercise, weight loss and healthy diet encouraged and reviewed with the pt. - Medication compliance encouraged    Uncomplicated asthma, unspecified asthma severity, unspecified whether persistent  -     Well-controlled on the current regimen. Denies trouble breathing/wheezing/shortness of breath. She needs refills of this medication today. - albuterol sulfate  (90 Base) MCG/ACT inhaler; INHALE 2 PUFFS BY MOUTH EVERY SIX HOURS AS NEEDED FOR WHEEZING-refilled today    Mixed hyperlipidemia  -     Reviewed adding a statin due to cholesterol levels and ASCVD risk score. Extensively reviewed in the room with the patient today. - Since we are starting two other new medications at this time, she states she would like to review this next visit  - gemfibrozil (LOPID) 600 MG tablet; Take 1 tablet by mouth 2 times daily-refilled today    Schizoaffective disorder, bipolar type ([facility]   - Continue with psychiatry and psychology    Smoker   - Smoking cessation recommended. Microalbuminuria  -    Asymptomatic.   - Reviewed with the patient. She is agreeable to starting lisinopril at this time  - lisinopril (PRINIVIL;ZESTRIL) 2.5 MG tablet; Take 1 tablet by mouth daily - patient education handout provided and reviewed with the pt. Side effects reviewed. Labs next visit. Onychomycosis  -    Found on foot exam. Pt would like help cutting her thick toenails.    - Amb External Referral To Podiatry    Need for flu shot    - Flu shot today - patient education handout provided and reviewed with the pt    All labs reviewed with the pt. Reviewed seeing hematology for WBC elevated. Pt declines- states this is chronic since starting psychotropic medications. Asymptomatic. Return in about 2 weeks (around 10/20/2020) for BP/microalbuminuria/labs and HLD f/u, or sooner if needed. Pt will call if symptoms worsen or fail to improve. All questions answered. Pt states no further questions or concerns at this time.    Electronically signed by: Kranthi Ordaz 10/06/20

## 2020-10-07 ENCOUNTER — TELEPHONE (OUTPATIENT)
Dept: ADMINISTRATIVE | Age: 40
End: 2020-10-07

## 2020-10-07 NOTE — TELEPHONE ENCOUNTER
PA SUBMITTED VIA CM FOR Invokana 100MG tablets   Key: [id number]   Rx #: [id number]   STATUS: PENDING

## 2020-10-13 NOTE — TELEPHONE ENCOUNTER
Called # given for cover my meds they said to go onto the portal and do it on there or else I have to call a number for [employer]. Can you please log in and send the info provided. She said it should give you what is needed on there but I am not sure how to do it. Thanks!

## 2020-10-16 ENCOUNTER — TELEPHONE (OUTPATIENT)
Dept: INTERNAL MEDICINE CLINIC | Age: 40
End: 2020-10-16

## 2020-10-16 NOTE — TELEPHONE ENCOUNTER
Resubmitting through cover my meds. Medication with the side effects was a medication that was supposed to be tried before invokana. Not sure why this was denied honestly.

## 2020-10-16 NOTE — TELEPHONE ENCOUNTER
----- Message from Ashley Vang sent at 10/15/2020  4:24 PM EDT -----  Subject: Medication Problem    QUESTIONS  Name of Medication? canagliflozin (INVOKANA) 100 MG TABS tablet  Patient-reported dosage and instructions? 1  What is the problem with the medication? patient needs a pre auth for this   medication from insurance . patient doesn't know how often she take Rx  Preferred Pharmacy? [address] [facility] [phone]  Pharmacy phone number (if available)? [phone]  Additional Information for Provider?   ---------------------------------------------------------------------------  --------------  CALL BACK INFO  What is the best way for the office to contact you? OK to leave message on   voicemail  Preferred Call Back Phone Number? [phone]  ---------------------------------------------------------------------------  --------------  SCRIPT ANSWERS  Relationship to Patient?  Self

## 2020-10-16 NOTE — TELEPHONE ENCOUNTER
Please perform prior auth- canagliflozin (INVOKANA) 100 MG TABS tablet;  Take 1 tablet by mouth every morning (before breakfast)

## 2020-10-21 ENCOUNTER — TELEPHONE (OUTPATIENT)
Dept: INTERNAL MEDICINE CLINIC | Age: 40
End: 2020-10-21

## 2020-10-21 NOTE — TELEPHONE ENCOUNTER
----- Message from Cherry Hernandez, [address] - CNP sent at 10/21/2020 11:57 AM EDT -----  Please call and inform the patient that her insurance denied the [facility]. They are agreeable to approving Real Dubin- another medication in this class. This was prescribed and sent to her pharmacy. She should take Real Dubin daily as prescribed. Please have the patient call with side effects. Please document call.

## 2020-10-21 NOTE — TELEPHONE ENCOUNTER
----- Message from Pradeep Cassidy sent at 10/21/2020  3:28 PM EDT -----  Subject: Medication Problem    QUESTIONS  Name of Medication? lisinopril (PRINIVIL;ZESTRIL) 2.5 MG tablet  Patient-reported dosage and instructions? Take 1 tablet by mouth daily  What question or problem do you have with the medication? Pt stopped   taking meds because it was causing problems with her breathing and felt   like it was making her neck swell up  Preferred Pharmacy? [facility] [phone]  Pharmacy phone number (if available)? [phone]  Additional Information for Provider?   ---------------------------------------------------------------------------  --------------  CALL BACK INFO  What is the best way for the office to contact you? OK to leave message on   voicemail  Preferred Call Back Phone Number? [phone]  ---------------------------------------------------------------------------  --------------  SCRIPT ANSWERS  Relationship to Patient?  Self

## 2020-10-21 NOTE — TELEPHONE ENCOUNTER
Are her symptoms resolved? Is she breathing normally now? If not- she needs to go to the ER.     Please schedule VV to review alternative to lisinopril

## 2020-10-22 ENCOUNTER — VIRTUAL VISIT (OUTPATIENT)
Dept: INTERNAL MEDICINE CLINIC | Age: 40
End: 2020-10-22
Payer: COMMERCIAL

## 2020-10-22 PROCEDURE — 99213 OFFICE O/P EST LOW 20 MIN: CPT | Performed by: NURSE PRACTITIONER

## 2020-10-22 PROCEDURE — 3051F HG A1C>EQUAL 7.0%<8.0%: CPT | Performed by: NURSE PRACTITIONER

## 2020-10-22 ASSESSMENT — ENCOUNTER SYMPTOMS
DIARRHEA: 0
NAUSEA: 0
VOMITING: 0
WHEEZING: 0
SHORTNESS OF BREATH: 0
COUGH: 0
ABDOMINAL PAIN: 0
CONSTIPATION: 0

## 2020-10-28 ENCOUNTER — TELEPHONE (OUTPATIENT)
Dept: ADMINISTRATIVE | Age: 40
End: 2020-10-28

## 2020-11-09 ASSESSMENT — ENCOUNTER SYMPTOMS
COUGH: 0
ABDOMINAL PAIN: 0
DIARRHEA: 0
WHEEZING: 0
CONSTIPATION: 0
NAUSEA: 0
VOMITING: 0
SHORTNESS OF BREATH: 0

## 2020-11-10 ENCOUNTER — VIRTUAL VISIT (OUTPATIENT)
Dept: INTERNAL MEDICINE CLINIC | Age: 40
End: 2020-11-10
Payer: COMMERCIAL

## 2020-11-10 PROCEDURE — 3051F HG A1C>EQUAL 7.0%<8.0%: CPT | Performed by: NURSE PRACTITIONER

## 2020-11-10 PROCEDURE — 99214 OFFICE O/P EST MOD 30 MIN: CPT | Performed by: NURSE PRACTITIONER

## 2020-11-10 RX ORDER — GLUCOSAMINE HCL/CHONDROITIN SU 500-400 MG
CAPSULE ORAL
Qty: 300 STRIP | Refills: 1 | Status: SHIPPED | OUTPATIENT
Start: 2020-11-10 | End: 2020-12-18 | Stop reason: SDUPTHER

## 2020-11-10 RX ORDER — LANCETS 30 GAUGE
1 EACH MISCELLANEOUS DAILY
Qty: 100 EACH | Refills: 0 | Status: SHIPPED | OUTPATIENT
Start: 2020-11-10 | End: 2020-11-25

## 2020-11-10 RX ORDER — LOSARTAN POTASSIUM 25 MG/1
25 TABLET ORAL DAILY
Qty: 30 TABLET | Refills: 0 | Status: SHIPPED | OUTPATIENT
Start: 2020-11-10 | End: 2020-11-30

## 2020-11-10 RX ORDER — BLOOD PRESSURE TEST KIT
KIT MISCELLANEOUS
Qty: 100 EACH | Refills: 0 | Status: SHIPPED | OUTPATIENT
Start: 2020-11-10 | End: 2021-01-19 | Stop reason: SDUPTHER

## 2020-11-10 NOTE — PATIENT INSTRUCTIONS
Patient Education   losartan  Pronunciation:  jay RENNY tan  Brand:  Cozaar  What is the most important information I should know about losartan? Do not use if you are pregnant, and tell your doctor right away if you become pregnant. Losartan can cause injury or death to the unborn baby during your second or third trimester. If you have diabetes, do not use losartan together with any medication that contains aliskiren (a blood pressure medicine). What is losartan? Losartan is an angiotensin II receptor antagonist (sometimes called an ARB blocker). Losartan is used to treat high blood pressure (hypertension) in adults and children who are at least 10years old. It is also used to lower the risk of stroke in certain people with heart disease. Losartan is also used to slow long-term kidney damage in people with type 2 diabetes who also have high blood pressure. Losartan may also be used for purposes not listed in this medication guide. What should I discuss with my healthcare provider before taking losartan? You should not use losartan if you are allergic to it. If you have diabetes, do not use losartan together with any medication that contains aliskiren (a blood pressure medicine). You may also need to avoid taking losartan with aliskiren if you have kidney disease. Do not use if you are pregnant, and tell your doctor right away if you become pregnant. Losartan can cause injury or death to the unborn baby if you take the medicine during your second or third trimester. Tell your doctor if you have ever had:  · kidney disease;  · liver disease;  · congestive heart failure;  · an electrolyte imbalance (such as high levels of potassium in your blood);  · if you are on a low-salt diet; or  · if you are dehydrated. You should not breast-feed while using this medicine. Losartan is not approved for use by anyone younger than 10years old. How should I take losartan?   Follow all directions on your prescription label and read all medication guides or instruction sheets. Your doctor may occasionally change your dose. Use the medicine exactly as directed. You may take losartan with or without food. Call your doctor if you are sick with vomiting or diarrhea, or if you are sweating more than usual. You can easily become dehydrated while taking losartan. This can lead to very low blood pressure, a serious electrolyte imbalance, or kidney failure. Your blood pressure will need to be checked often and you may need other blood and urine tests. It may take 3 to 6 weeks before your blood pressure is under control. For best results, keep using the medicine as directed. Talk with your doctor if your condition does not improve after 3 weeks of treatment. If you have high blood pressure, keep using this medicine even if you feel well. High blood pressure often has no symptoms. You may need to use blood pressure medicine for the rest of your life. Store at room temperature away from moisture, heat, and light. What happens if I miss a dose? Take the medicine as soon as you can, but skip the missed dose if it is almost time for your next dose. Do not take two doses at one time. What happens if I overdose? Seek emergency medical attention or call the Poison Help line at 1-439.646.5346. What should I avoid while taking losartan? Drinking alcohol can further lower your blood pressure and may increase certain side effects of losartan. Do not use potassium supplements or salt substitutes, unless your doctor has told you to. Avoid getting up too fast from a sitting or lying position, or you may feel dizzy. What are the possible side effects of losartan? Get emergency medical help if you have signs of an allergic reaction: hives; difficult breathing; swelling of your face, lips, tongue, or throat.   Call your doctor at once if you have:  · a light-headed feeling, like you might pass out;  · pain or burning when you urinate;  · high potassium level --nausea, weakness, tingly feeling, chest pain, irregular heartbeats, loss of movement; or  · kidney problems --little or no urination, rapid weight gain, painful or difficult urination, swelling in your hands, feet, or ankles. Common side effects may include:  · dizziness;  · back pain; or  · cold symptoms such as stuffy nose, sneezing, sore throat. This is not a complete list of side effects and others may occur. Call your doctor for medical advice about side effects. You may report side effects to FDA at 9-357-FDA-0672. What other drugs will affect losartan? Tell your doctor about all your other medicines, especially:  · a diuretic or \"water pill\";  · other blood pressure medications;  · lithium; or  · NSAIDs (nonsteroidal anti-inflammatory drugs) --aspirin, ibuprofen (Advil, Motrin), naproxen (Aleve), celecoxib, diclofenac, indomethacin, meloxicam, and others. This list is not complete. Other drugs may affect losartan, including prescription and over-the-counter medicines, vitamins, and herbal products. Not all possible drug interactions are listed here. Where can I get more information? Your pharmacist can provide more information about losartan. Remember, keep this and all other medicines out of the reach of children, never share your medicines with others, and use this medication only for the indication prescribed. Every effort has been made to ensure that the information provided by [address] is accurate, up-to-date, and complete, but no guarantee is made to that effect. Drug information contained herein may be time sensitive. [facility] information has been compiled for use by healthcare practitioners and consumers in the [place] and therefore [facility] does not warrant that uses outside of the [place] are appropriate, unless specifically indicated otherwise.  [facility]'s drug information does not endorse drugs, diagnose patients or recommend therapy. [facility]'s drug information is an informational resource designed to assist licensed healthcare practitioners in caring for their patients and/or to serve consumers viewing this service as a supplement to, and not a substitute for, the expertise, skill, knowledge and judgment of healthcare practitioners. The absence of a warning for a given drug or drug combination in no way should be construed to indicate that the drug or drug combination is safe, effective or appropriate for any given patient. [facility] does not assume any responsibility for any aspect of healthcare administered with the aid of information [facility] provides. The information contained herein is not intended to cover all possible uses, directions, precautions, warnings, drug interactions, allergic reactions, or adverse effects. If you have questions about the drugs you are taking, check with your doctor, nurse or pharmacist.  Copyright 3250-9021 [address]. Version: 16.01. Revision date: 4/10/2019. Care instructions adapted under license by [facility] ([facility]). If you have questions about a medical condition or this instruction, always ask your healthcare professional. [place] [address] any warranty or liability for your use of this information.

## 2020-11-10 NOTE — PROGRESS NOTES
of choice to check blood sugar daily #90 day  E11.65 300 strip 1    losartan (COZAAR) 25 MG tablet Take 1 tablet by mouth daily 30 tablet 0    dapagliflozin (FARXIGA) 5 MG tablet Take 1 tablet by mouth every morning 90 tablet 0    gemfibrozil (LOPID) 600 MG tablet Take 1 tablet by mouth 2 times daily 180 tablet 0    Dulaglutide (TRULICITY) 1.5 GG/5.4OK SOPN Inject 1.5 mg into the skin once a week 2 mL 5    glipiZIDE (GLUCOTROL XL) 10 MG extended release tablet Take 2 tablets by mouth daily 180 tablet 0    albuterol sulfate  (90 Base) MCG/ACT inhaler INHALE 2 PUFFS BY MOUTH EVERY SIX HOURS AS NEEDED FOR WHEEZING 18 g 0    folic acid-pyridoxine-cyanocobalamine (FOLTX) 2.5-25-1 MG TABS tablet Take 1 tablet by mouth daily      traZODone (DESYREL) 100 MG tablet Take 100 mg by mouth nightly 1-2 tabs nightly      paliperidone Palmitate (INVEGA TRINZA) 273 MG/0.875ML SUSP IM injection Inject 273 mg into the muscle once      clonazePAM (KLONOPIN) 0.5 MG tablet Take 0.5 mg by mouth 3 times daily as needed. Efrain Wood ziprasidone (GEODON) 60 MG capsule Take 60 mg by mouth every morning      FLUoxetine (PROZAC) 20 MG capsule Take 20 mg by mouth daily      hydrOXYzine (VISTARIL) 50 MG capsule Take 50 mg by mouth 4 times daily as needed for Itching      ziprasidone (GEODON) 80 MG capsule Take 80 mg by mouth 2 times daily (with meals)         Patient Active Problem List   Diagnosis    Overdose of antidepressant    Schizophrenia ([facility]    Diabetes mellitus ([facility]    Asthma    Hyperlipidemia        Wt Readings from Last 3 Encounters:   10/06/20 235 lb (106.6 kg)   04/15/20 238 lb (108 kg)   01/14/20 235 lb (106.6 kg)     BP Readings from Last 3 Encounters:   10/06/20 128/82   01/14/20 124/70   10/15/19 124/70     Objective/Physical Exam:  Virtual Video Exam  Patient-Reported Vitals 11/10/2020   Patient-Reported Weight 230 lb   Patient-Reported Height 5'9.5''   Patient-Reported Temperature -     ^no access to other VS d/t VV per pt  Physical Exam  Vitals signs reviewed. Constitutional:       General: She is not in acute distress. Appearance: She is well-developed. She is not diaphoretic. HENT:      Head: Normocephalic and atraumatic. Eyes:      Pupils: Pupils are equal, round, and reactive to light. Cardiovascular:      Rate and Rhythm: Normal rate and regular rhythm. Pulmonary:      Effort: Pulmonary effort is normal. No respiratory distress. Breath sounds: Normal breath sounds. No wheezing or rales. Chest:      Chest wall: No tenderness. Abdominal:      General: Bowel sounds are normal.      Palpations: Abdomen is soft. Skin:     General: Skin is warm and dry. Coloration: Skin is not pale. Findings: No erythema or rash. Neurological:      Mental Status: She is alert and oriented to person, place, and time. Coordination: Coordination normal.   Psychiatric:         Mood and Affect: Mood normal.     Due to this being a TeleHealth encounter, evaluation of the following organ systems is limited: Vitals/Constitutional/EENT/Resp/CV/GI//MS/Neuro/Skin/Heme-Lymph-Imm. Assessment and Plan:  Cathi Mcardle was seen today for follow-up. Diagnoses and all orders for this visit:    Type 2 diabetes mellitus without complication, without long-term current use of insulin ([address]  -     Recommended pt take her fasting blood sugar daily. - Continue current medication regimen- she denies side effects. Side effects reviewed. All questions answered. - Alcohol Swabs PADS; Use with blood sugar monitoring  -     Lancets MISC; 1 each by Does not apply route daily  -     blood glucose monitor kit and supplies; Test 1 time a day & as needed for symptoms of irregular blood glucose. -     blood glucose monitor strips; Test strips of choice to check blood sugar daily #90 day  E11.65    Microalbuminuria  -     Complications of microalbuminuria reviewed with the patient.   Risks versus benefits were reviewed. - Extensive discussion occurred. - Reviewed the cross-reactivity of ACE inhibitor's and ARBs in depth. Pt with reaction to lisinopril. Reviewed starting losartan at a low dose. Risks vs benefits reviewed. Pt agreeable to starting medication  - losartan (COZAAR) 25 MG tablet; Take 1 tablet by mouth daily - patient education handout provided and reviewed with the pt. - Side effects were reviewed. Strict protocol to go to the ER reviewed- all questions answered. - Patient will call if symptoms occur    Return in about 4 weeks (around 12/8/2020) for BP f/u from microalbuminuria, or sooner if needed. Pt will call if symptoms worsen or fail to improve. Sophia Choi is a 36 y.o. female being evaluated by a Virtual Visit (video visit) encounter to address concerns as mentioned above. A caregiver was present when appropriate. Due to this being a TeleHealth encounter (During [facility]- public health emergency), evaluation of the following organ systems was limited: Vitals/Constitutional/EENT/Resp/CV/GI//MS/Neuro/Skin/Heme-Lymph-Imm. Pursuant to the emergency declaration under the [address] [place] [address], [address] [place] [address] authority and the [employer] and [employer] Act, this Virtual Visit was conducted with patient's (and/or legal guardian's) consent, to reduce the patient's risk of exposure to COVID-19 and provide necessary medical care. The patient (and/or legal guardian) has also been advised to contact this office for worsening conditions or problems, and seek emergency medical treatment and/or call 911 if deemed necessary. Patient identification was verified at the start of the visit: Yes    Total time spent on this encounter: Not billed by time    Services were provided through a video synchronous discussion virtually to substitute for in-person clinic visit. Patient and provider were located at their individual homes.     All questions answered. Pt states no further questions or concerns at this time.    Electronically signed by: Dorrie Schlatter 11/10/20

## 2020-11-25 RX ORDER — LANCETS 30 GAUGE
EACH MISCELLANEOUS
Qty: 100 EACH | Refills: 0 | Status: SHIPPED | OUTPATIENT
Start: 2020-11-25 | End: 2021-01-19 | Stop reason: SDUPTHER

## 2020-11-30 RX ORDER — LOSARTAN POTASSIUM 25 MG/1
TABLET ORAL
Qty: 30 TABLET | Refills: 0 | Status: SHIPPED | OUTPATIENT
Start: 2020-11-30 | End: 2020-12-22 | Stop reason: SINTOL

## 2020-12-07 ENCOUNTER — TELEPHONE (OUTPATIENT)
Dept: INTERNAL MEDICINE CLINIC | Age: 40
End: 2020-12-07

## 2020-12-07 NOTE — TELEPHONE ENCOUNTER
----- Message from Beulah Fall sent at 12/7/2020 12:07 PM EST -----  Subject: Message to Provider    QUESTIONS  Information for Provider? Josué would like to notify Dr. Staci Alexander that she   is no longer taking the Losartan that she prescribed for her.   ---------------------------------------------------------------------------  --------------  [address]  What is the best way for the office to contact you? OK to leave message on   voicemail  Preferred Call Back Phone Number? [phone]  ---------------------------------------------------------------------------  --------------  SCRIPT ANSWERS  Relationship to Patient?  Self

## 2020-12-08 NOTE — TELEPHONE ENCOUNTER
Please call to evaluate - why did she stop this medication? What is her BP running? If she would like to discuss, OK for VV.

## 2020-12-08 NOTE — TELEPHONE ENCOUNTER
Pt. 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 Pt. Said she asked to switch providers. I explained to her she should not have been scheduled without it being approved because we don't allowing switching normally.  I let her know I would send the message back and let her know

## 2020-12-09 NOTE — TELEPHONE ENCOUNTER
We do not generally transfer internally due to office scheduling cross coverage and on call coverage. Please provide her the number for [phone] [facility] scheduling if she would like to schedule at a different office.

## 2020-12-18 ENCOUNTER — TELEPHONE (OUTPATIENT)
Dept: INTERNAL MEDICINE CLINIC | Age: 40
End: 2020-12-18

## 2020-12-18 RX ORDER — ALBUTEROL SULFATE 90 UG/1
AEROSOL, METERED RESPIRATORY (INHALATION)
Qty: 1 INHALER | Refills: 0 | Status: SHIPPED | OUTPATIENT
Start: 2020-12-18 | End: 2020-12-22 | Stop reason: SDUPTHER

## 2020-12-18 RX ORDER — GLUCOSAMINE HCL/CHONDROITIN SU 500-400 MG
CAPSULE ORAL
Qty: 100 STRIP | Refills: 0 | Status: SHIPPED | OUTPATIENT
Start: 2020-12-18 | End: 2021-01-19 | Stop reason: SDUPTHER

## 2020-12-18 NOTE — TELEPHONE ENCOUNTER
----- Message from Guilherme Reich sent at 12/18/2020 11:43 AM EST -----  Subject: Refill Request    QUESTIONS  Name of Medication? Dulaglutide (TRULICITY) 1.5 UY/4.7UC SOPN  Patient-reported dosage and instructions? Inject 1.5 mg into the skin once   a week  How many days do you have left? 0  [id number]  Pharmacy phone number (if available)? [phone]  Additional Information for Provider? 2.5 ML  ---------------------------------------------------------------------------  --------------    Name of Medication? glipiZIDE (GLUCOTROL XL) 10 MG extended release tablet  Patient-reported dosage and instructions? Take 2 tablets by mouth daily  How many days do you have left? 40  [facility]erred [facility]? [facility] #[id number]  Pharmacy phone number (if available)? [phone]  Additional Information for Provider? 180 ct  ---------------------------------------------------------------------------  --------------    Name of Medication? dapagliflozin (FARXIGA) 5 MG tablet  Patient-reported dosage and instructions? Take 1 tablet by mouth every   morning  How many days do you have left? 35  Preferred [facility]? [facility] #[id number]  Pharmacy phone number (if available)? [phone]  Additional Information for Provider? 90 tablets  ---------------------------------------------------------------------------  --------------    Name of Medication? albuterol sulfate  (90 Base) MCG/ACT inhaler  Patient-reported dosage and instructions? INHALE 2 PUFFS BY MOUTH EVERY   SIX HOURS AS NEEDED FOR WHEEZING  How many days do you have left? 0  [facility]erred [facility]? [facility] #[id number]  Pharmacy phone number (if available)? [phone]  Additional Information for Provider? 18 g  ---------------------------------------------------------------------------  --------------    Name of Medication? blood glucose monitor strips  Patient-reported dosage and instructions?  Test strips of choice to check   blood sugar daily #90 day E11.65  How many days do you have left? Unknown  Preferred Pharmacy? [facility] #[id number]  Pharmacy phone number (if available)? [phone]  Additional Information for Provider? 300 strips  ---------------------------------------------------------------------------  --------------  CALL BACK INFO  What is the best way for the office to contact you? OK to leave message on   voicemail  Preferred Call Back Phone Number?  [phone]

## 2020-12-18 NOTE — TELEPHONE ENCOUNTER
Appears pt should have these medications until Jan 2021. Please call pharmacy and ensure they dispensed most recent script.

## 2020-12-18 NOTE — TELEPHONE ENCOUNTER
Spoke with pharmacist at [facility]. Patient has 1 refill left on Trulicity and glipizide. They will get these ready for her to .  However, they did not receive scripts for the test strips, albuterol inhaler, or Farxiga from when her medications were transferred from [place]

## 2020-12-18 NOTE — TELEPHONE ENCOUNTER
Pt. Due for f/u. Pt. Was also trying to switch providers. She stated Modesta Ramey will just stick with [facility] for now she guesses. \"     Pt. Told she needed to schedule for refills since she is overdue. Pt. Refused to come in office to do BP check and appointment. Scheduled VV Tuesday.

## 2020-12-22 ENCOUNTER — VIRTUAL VISIT (OUTPATIENT)
Dept: INTERNAL MEDICINE CLINIC | Age: 40
End: 2020-12-22
Payer: COMMERCIAL

## 2020-12-22 PROCEDURE — 99214 OFFICE O/P EST MOD 30 MIN: CPT | Performed by: NURSE PRACTITIONER

## 2020-12-22 PROCEDURE — 3051F HG A1C>EQUAL 7.0%<8.0%: CPT | Performed by: NURSE PRACTITIONER

## 2020-12-22 RX ORDER — GLIPIZIDE 10 MG/1
20 TABLET, FILM COATED, EXTENDED RELEASE ORAL DAILY
Qty: 60 TABLET | Refills: 0 | Status: SHIPPED | OUTPATIENT
Start: 2020-12-22 | End: 2021-01-19 | Stop reason: SDUPTHER

## 2020-12-22 RX ORDER — DULAGLUTIDE 1.5 MG/.5ML
1.5 INJECTION, SOLUTION SUBCUTANEOUS WEEKLY
Qty: 2 ML | Refills: 2 | Status: SHIPPED | OUTPATIENT
Start: 2020-12-22 | End: 2021-01-19 | Stop reason: SDUPTHER

## 2020-12-22 RX ORDER — ALBUTEROL SULFATE 90 UG/1
AEROSOL, METERED RESPIRATORY (INHALATION)
Qty: 1 INHALER | Refills: 0 | Status: SHIPPED | OUTPATIENT
Start: 2020-12-22 | End: 2021-01-19 | Stop reason: SDUPTHER

## 2020-12-22 RX ORDER — GEMFIBROZIL 600 MG/1
600 TABLET, FILM COATED ORAL 2 TIMES DAILY
Qty: 180 TABLET | Refills: 0 | Status: SHIPPED | OUTPATIENT
Start: 2020-12-22 | End: 2021-01-19 | Stop reason: SDUPTHER

## 2020-12-22 ASSESSMENT — ENCOUNTER SYMPTOMS
VOMITING: 0
ABDOMINAL PAIN: 0
COUGH: 0
SHORTNESS OF BREATH: 0
DIARRHEA: 0
CONSTIPATION: 0
NAUSEA: 0
WHEEZING: 0

## 2020-12-22 NOTE — PROGRESS NOTES
Skin: Negative for pallor and rash. Neurological: Negative for dizziness, weakness, light-headedness, numbness and headaches. Psychiatric/Behavioral: Negative for dysphoric mood, self-injury, sleep disturbance and suicidal ideas. The patient is not nervous/anxious. Allergies   Allergen Reactions    Latuda [Lurasidone Hcl] Anaphylaxis    Lisinopril Swelling and Palpitations       Current Outpatient Rx   Medication Sig Dispense Refill    glipiZIDE (GLUCOTROL XL) 10 MG extended release tablet Take 2 tablets by mouth daily 60 tablet 0    Dulaglutide (TRULICITY) 1.5 NA/4.7YW SOPN Inject 1.5 mg into the skin once a week 2 mL 2    gemfibrozil (LOPID) 600 MG tablet Take 1 tablet by mouth 2 times daily 180 tablet 0    dapagliflozin (FARXIGA) 5 MG tablet Take 1 tablet by mouth every morning 30 tablet 0    albuterol sulfate  (90 Base) MCG/ACT inhaler INHALE 2 PUFFS BY MOUTH EVERY SIX HOURS AS NEEDED FOR WHEEZING 1 Inhaler 0    blood glucose monitor strips Test strips of choice to check blood sugar daily #90 day  E11.65 100 strip 0    Lancets (ONETOUCH DELICA PLUS [id number]) MISC use daily as directed 100 each 0    Alcohol Swabs PADS Use with blood sugar monitoring 100 each 0    blood glucose monitor kit and supplies Test 1 time a day & as needed for symptoms of irregular blood glucose. 1 kit 0    folic acid-pyridoxine-cyanocobalamine (FOLTX) 2.5-25-1 MG TABS tablet Take 1 tablet by mouth daily      traZODone (DESYREL) 100 MG tablet Take 100 mg by mouth nightly 1-2 tabs nightly      paliperidone Palmitate (INVEGA TRINZA) 273 MG/0.875ML SUSP IM injection Inject 273 mg into the muscle once      clonazePAM (KLONOPIN) 0.5 MG tablet Take 0.5 mg by mouth 3 times daily as needed. Loan Brewer ziprasidone (GEODON) 60 MG capsule Take 60 mg by mouth every morning      FLUoxetine (PROZAC) 20 MG capsule Take 20 mg by mouth daily      hydrOXYzine (VISTARIL) 50 MG capsule Take 50 mg by mouth 4 times daily as needed for Itching      ziprasidone (GEODON) 80 MG capsule Take 80 mg by mouth 2 times daily (with meals)         Patient Active Problem List   Diagnosis    Overdose of antidepressant    [facility] [address]    Diabetes mellitus ([facility] [address]    Asthma    Hyperlipidemia        Wt Readings from Last 3 Encounters:   10/06/20 235 lb (106.6 kg)   04/15/20 238 lb (108 kg)   01/14/20 235 lb (106.6 kg)     BP Readings from Last 3 Encounters:   10/06/20 128/82   01/14/20 124/70   10/15/19 124/70     The 10-year ASCVD risk score (Jose Fernandez, et al., 2013) is: 12.7%    Values used to calculate the score:      Age: 36 years      Sex: Female      Is Non- : No      Diabetic: Yes      Tobacco smoker: Yes      Systolic Blood Pressure: 889 mmHg      Is BP treated: No      HDL Cholesterol: 36 mg/dL      Total Cholesterol: 224 mg/dL  ^ Reviewed statin. Pt reports that she would like to continue current regimen without adding another medication until her next in-person appointment. Objective/Physical Exam:  Virtual Video Exam  Patient-Reported Vitals 12/22/2020   Patient-Reported Weight 230 lb   Patient-Reported Height 5'9.5''   Patient-Reported Temperature -     ^no access to other VS d/t VV per pt. Physical Exam  Constitutional:       General: She is not in acute distress. Appearance: Normal appearance. She is not ill-appearing. Pulmonary:      Effort: Pulmonary effort is normal. No respiratory distress. Skin:     Coloration: Skin is not jaundiced or pale. Neurological:      Mental Status: She is alert. Comments: No facial asymmetry noted   Psychiatric:         Mood and Affect: Mood normal.     Due to this being a TeleHealth encounter, evaluation of the following organ systems is limited: Vitals/Constitutional/EENT/Resp/CV/GI//MS/Neuro/Skin/Heme-Lymph-Imm. Assessment and Plan:  Mili Olsen was seen today for hypertension.     Diagnoses and all orders for this visit:    Microalbuminuria   - Side effects on losartan as well. Stop lisinopril and losartan. - Patient reports that she is not interested in trying another medication at this time. Risks versus benefits were reviewed and patient states she is aware that she would not like to start another medication today. Type 2 diabetes mellitus without complication, without long-term current use of insulin ([facility] [address]  -    Patient states that her sugars are running 130s on average. She denies side effects on this regimen. She would like to continue on the current regimen.  - glipiZIDE (GLUCOTROL XL) 10 MG extended release tablet; Take 2 tablets by mouth daily  -     Dulaglutide (TRULICITY) 1.5 UC/0.9WF SOPN; Inject 1.5 mg into the skin once a week  -     dapagliflozin (FARXIGA) 5 MG tablet; Take 1 tablet by mouth every morning    Mixed hyperlipidemia  -    Patient not interested in a statin at this time. She is agreeable to continue gemfibrozil as prescribed. - gemfibrozil (LOPID) 600 MG tablet; Take 1 tablet by mouth 2 times daily-refilled today    Uncomplicated asthma, unspecified asthma severity, unspecified whether persistent  -    Well-controlled per patient. Rare albuterol use. - albuterol sulfate  (90 Base) MCG/ACT inhaler; INHALE 2 PUFFS BY MOUTH EVERY SIX HOURS AS NEEDED FOR WHEEZING    Schizoaffective disorder, bipolar type ([facility] [address]   - Continue with psychiatry and psychology    Return in about 4 weeks (around 1/19/2021) for A1C/asthma/HLD/mood f/u, or sooner if needed. Pt will call if symptoms worsen or fail to improve. Oli Bernard is a 36 y.o. female being evaluated by a Virtual Visit (video visit) encounter to address concerns as mentioned above. A caregiver was present when appropriate. Due to this being a TeleHealth encounter (During [place] public health emergency), evaluation of the following organ systems was limited: Vitals/Constitutional/EENT/Resp/CV/GI//MS/Neuro/Skin/Heme-Lymph-Imm.   Pursuant to the emergency declaration under the [facility], [facility] waiver authority and the [employer] Act, this Virtual Visit was conducted with patient's (and/or legal guardian's) consent, to reduce the patient's risk of exposure to COVID-19 and provide necessary medical care. The patient (and/or legal guardian) has also been advised to contact this office for worsening conditions or problems, and seek emergency medical treatment and/or call 911 if deemed necessary. Patient identification was verified at the start of the visit: Yes    Total time spent on this encounter: Not billed by time    Services were provided through a video synchronous discussion virtually to substitute for in-person clinic visit. Patient and provider were located at their individual homes. All questions answered. Pt states no further questions or concerns at this time.    Electronically signed by: Mortimer Kub 12/22/20

## 2021-01-19 ENCOUNTER — VIRTUAL VISIT (OUTPATIENT)
Dept: INTERNAL MEDICINE CLINIC | Age: 41
End: 2021-01-19
Payer: COMMERCIAL

## 2021-01-19 DIAGNOSIS — J45.909 UNCOMPLICATED ASTHMA, UNSPECIFIED ASTHMA SEVERITY, UNSPECIFIED WHETHER PERSISTENT: ICD-10-CM

## 2021-01-19 DIAGNOSIS — E78.2 MIXED HYPERLIPIDEMIA: ICD-10-CM

## 2021-01-19 DIAGNOSIS — F17.200 SMOKER: ICD-10-CM

## 2021-01-19 DIAGNOSIS — E11.9 TYPE 2 DIABETES MELLITUS WITHOUT COMPLICATION, WITHOUT LONG-TERM CURRENT USE OF INSULIN (HCC): ICD-10-CM

## 2021-01-19 DIAGNOSIS — F25.0 SCHIZOAFFECTIVE DISORDER, BIPOLAR TYPE (HCC): ICD-10-CM

## 2021-01-19 DIAGNOSIS — R80.9 MICROALBUMINURIA: Primary | ICD-10-CM

## 2021-01-19 LAB — HBA1C MFR BLD: 7.2 %

## 2021-01-19 PROCEDURE — 99214 OFFICE O/P EST MOD 30 MIN: CPT | Performed by: NURSE PRACTITIONER

## 2021-01-19 PROCEDURE — 3051F HG A1C>EQUAL 7.0%<8.0%: CPT | Performed by: NURSE PRACTITIONER

## 2021-01-19 PROCEDURE — 83036 HEMOGLOBIN GLYCOSYLATED A1C: CPT | Performed by: NURSE PRACTITIONER

## 2021-01-19 RX ORDER — DULAGLUTIDE 1.5 MG/.5ML
1.5 INJECTION, SOLUTION SUBCUTANEOUS WEEKLY
Qty: 2 ML | Refills: 2 | Status: SHIPPED | OUTPATIENT
Start: 2021-01-19 | End: 2021-04-20 | Stop reason: SDUPTHER

## 2021-01-19 RX ORDER — GEMFIBROZIL 600 MG/1
600 TABLET, FILM COATED ORAL 2 TIMES DAILY
Qty: 180 TABLET | Refills: 0 | Status: SHIPPED | OUTPATIENT
Start: 2021-01-19 | End: 2021-04-20 | Stop reason: SDUPTHER

## 2021-01-19 RX ORDER — BLOOD PRESSURE TEST KIT
KIT MISCELLANEOUS
Qty: 100 EACH | Refills: 0 | Status: SHIPPED | OUTPATIENT
Start: 2021-01-19 | End: 2021-03-02

## 2021-01-19 RX ORDER — LANCETS 30 GAUGE
EACH MISCELLANEOUS
Qty: 100 EACH | Refills: 0 | Status: SHIPPED | OUTPATIENT
Start: 2021-01-19 | End: 2021-04-20 | Stop reason: SDUPTHER

## 2021-01-19 RX ORDER — GLIPIZIDE 10 MG/1
20 TABLET, FILM COATED, EXTENDED RELEASE ORAL DAILY
Qty: 60 TABLET | Refills: 0 | Status: SHIPPED | OUTPATIENT
Start: 2021-01-19 | End: 2021-03-04

## 2021-01-19 RX ORDER — GLUCOSAMINE HCL/CHONDROITIN SU 500-400 MG
CAPSULE ORAL
Qty: 100 STRIP | Refills: 0 | Status: SHIPPED | OUTPATIENT
Start: 2021-01-19 | End: 2021-04-20 | Stop reason: SDUPTHER

## 2021-01-19 RX ORDER — ALBUTEROL SULFATE 90 UG/1
AEROSOL, METERED RESPIRATORY (INHALATION)
Qty: 1 INHALER | Refills: 0 | Status: SHIPPED | OUTPATIENT
Start: 2021-01-19 | End: 2021-02-16

## 2021-01-19 ASSESSMENT — ENCOUNTER SYMPTOMS
ABDOMINAL PAIN: 0
DIARRHEA: 0
COUGH: 0
VOMITING: 0
SHORTNESS OF BREATH: 0
NAUSEA: 0
CONSTIPATION: 0
WHEEZING: 0

## 2021-01-19 ASSESSMENT — PATIENT HEALTH QUESTIONNAIRE - PHQ9
8. MOVING OR SPEAKING SO SLOWLY THAT OTHER PEOPLE COULD HAVE NOTICED. OR THE OPPOSITE, BEING SO FIGETY OR RESTLESS THAT YOU HAVE BEEN MOVING AROUND A LOT MORE THAN USUAL: 0
4. FEELING TIRED OR HAVING LITTLE ENERGY: 1
2. FEELING DOWN, DEPRESSED OR HOPELESS: 0
10. IF YOU CHECKED OFF ANY PROBLEMS, HOW DIFFICULT HAVE THESE PROBLEMS MADE IT FOR YOU TO DO YOUR WORK, TAKE CARE OF THINGS AT HOME, OR GET ALONG WITH OTHER PEOPLE: 0
SUM OF ALL RESPONSES TO PHQ QUESTIONS 1-9: 3
5. POOR APPETITE OR OVEREATING: 1
3. TROUBLE FALLING OR STAYING ASLEEP: 0
SUM OF ALL RESPONSES TO PHQ QUESTIONS 1-9: 3

## 2021-01-19 NOTE — PROGRESS NOTES
Office Visit   1/19/2021    Subjective:  Chief Complaint   Patient presents with    Diabetes     mood      HPI:   Shade Arnold is a 36 y.o. female who presents to the clinic today for follow up. Microalbuminuria-noted on 9/30/2020.  Lisinopril and losartan were both trialed separately. Side effects occurred. Lisinopril was stopped and symptoms resolved. Losartan was then trialed with side effects. She stopped and her symptoms completely resolved off of this medication. She is not interested in another ACE or ARB.    DM- Prescribed Glipizide ER 20 mg daily and Trulicity 1.5 mg SQ weekly and farxiga 5 mg daily.   Metformin caused \"explosive diarrhea. \" Jardiance had side effects. Fasting sugars are around  at home. No episodes of hypoglycemia. Asymptomatic.   Cutting out pop. Eating better at home. Not exercising. Asthma- childhood asthma per pt.  Uses albuterol as needed with relief- using 0-5x/week- more in winter. Denies trouble breathing/SOB/wheezing. PFT ordered- not completed.     Hyperlipidemia-the patient is taking gemfibrozil 600 mg by mouth twice daily without side effects.  She would like to continue this medication at this time.      Mood- Continues to follow with Jb Philippe. Following with psychiatry and psychology. Stable at this time per pt. Denies SI/HI. Smoking - 1ppd. Not ready to quit. No acute concerns. Review of Systems   Constitutional: Negative for chills, fatigue, fever and unexpected weight change. Eyes: Negative for visual disturbance. Respiratory: Negative for cough, shortness of breath and wheezing. Cardiovascular: Negative for chest pain, palpitations and leg swelling. Gastrointestinal: Negative for abdominal pain, constipation, diarrhea, nausea and vomiting. Skin: Negative for pallor and rash. Neurological: Negative for dizziness, weakness, light-headedness, numbness and headaches.    Psychiatric/Behavioral: Negative for dysphoric mood, self-injury, sleep disturbance and suicidal ideas. The patient is not nervous/anxious. Allergies   Allergen Reactions    Latuda [Lurasidone Hcl] Anaphylaxis    Lisinopril Swelling and Palpitations     Current Outpatient Rx   Medication Sig Dispense Refill    Alcohol Swabs PADS Use with blood sugar monitoring 100 each 0    Lancets (ONETOUCH DELICA PLUS [id number]) MISC use daily as directed 100 each 0    blood glucose monitor strips Test strips of choice to check blood sugar daily #90 day  E11.65 100 strip 0    albuterol sulfate  (90 Base) MCG/ACT inhaler INHALE 2 PUFFS BY MOUTH EVERY SIX HOURS AS NEEDED FOR WHEEZING 1 Inhaler 0    dapagliflozin (FARXIGA) 5 MG tablet Take 1 tablet by mouth every morning 90 tablet 0    gemfibrozil (LOPID) 600 MG tablet Take 1 tablet by mouth 2 times daily 180 tablet 0    Dulaglutide (TRULICITY) 1.5 RAJIV/5.9KB SOPN Inject 1.5 mg into the skin once a week 2 mL 2    glipiZIDE (GLUCOTROL XL) 10 MG extended release tablet Take 2 tablets by mouth daily 60 tablet 0    blood glucose monitor kit and supplies Test 1 time a day & as needed for symptoms of irregular blood glucose. 1 kit 0    traZODone (DESYREL) 100 MG tablet Take 100 mg by mouth nightly 1-2 tabs nightly      paliperidone Palmitate (INVEGA TRINZA) 273 MG/0.875ML SUSP IM injection Inject 273 mg into the muscle once      clonazePAM (KLONOPIN) 0.5 MG tablet Take 0.5 mg by mouth 3 times daily as needed. [place] Flattery ziprasidone (GEODON) 60 MG capsule Take 60 mg by mouth every morning      FLUoxetine (PROZAC) 20 MG capsule Take 20 mg by mouth daily      ziprasidone (GEODON) 80 MG capsule Take 80 mg by mouth 2 times daily (with meals)      folic acid-pyridoxine-cyanocobalamine (FOLTX) 2.5-25-1 MG TABS tablet Take 1 tablet by mouth daily      hydrOXYzine (VISTARIL) 50 MG capsule Take 50 mg by mouth 4 times daily as needed for Itching       Patient Active Problem List   Diagnosis    Overdose of antidepressant    [facility] [address]    Diabetes mellitus ([facility]    Asthma    Hyperlipidemia      Wt Readings from Last 3 Encounters:   10/06/20 235 lb (106.6 kg)   04/15/20 238 lb (108 kg)   01/14/20 235 lb (106.6 kg)     BP Readings from Last 3 Encounters:   10/06/20 128/82   01/14/20 124/70   10/15/19 124/70     PHQ-9 Total Score: 3 (1/19/2021  2:49 PM)  Thoughts that you would be better off dead, or of hurting yourself in some way: 0 (1/19/2021  2:49 PM)    VS were not patient-reported. VS were in person:  Patient-Reported Vitals 1/19/2021   Patient-Reported Weight 229lb   Patient-Reported Height    Patient-Reported Systolic 572   Patient-Reported Diastolic 64   Patient-Reported Pulse 100     Objective/Physical Exam:  Physical Exam  Vitals signs reviewed. Constitutional:       General: She is not in acute distress. Appearance: She is well-developed. She is not diaphoretic. HENT:      Head: Normocephalic and atraumatic. Eyes:      Pupils: Pupils are equal, round, and reactive to light. Cardiovascular:      Rate and Rhythm: Normal rate and regular rhythm. Pulmonary:      Effort: Pulmonary effort is normal. No respiratory distress. Breath sounds: Normal breath sounds. No wheezing or rales. Chest:      Chest wall: No tenderness. Abdominal:      General: Bowel sounds are normal.      Palpations: Abdomen is soft. Skin:     General: Skin is warm and dry. Coloration: Skin is not pale. Findings: No erythema or rash. Neurological:      Mental Status: She is alert and oriented to person, place, and time. Coordination: Coordination normal.   Psychiatric:         Mood and Affect: Mood normal.       Assessment and Plan:  Nitin Fairchild was seen today for diabetes. Diagnoses and all orders for this visit:    Microalbuminuria   - Not interested in trialing another ACE or ARB. Risks vs benefits reviewed.     Type 2 diabetes mellitus without complication, without long-term current use of insulin (HCC)  - POCT glycosylated hemoglobin (Hb A1C)- 7.2%  - Lifestyle modifications such as exercise, weight loss and healthy diet encouraged and reviewed with the pt. - Increase lifestyle modifications and continue current medication regimen. -     Alcohol Swabs PADS; Use with blood sugar monitoring  -     Lancets (ONETOUCH DELICA PLUS [id number]) MISC; use daily as directed  -     blood glucose monitor strips; Test strips of choice to check blood sugar daily #90 day  E11.65  -     dapagliflozin (FARXIGA) 5 MG tablet; Take 1 tablet by mouth every morning- refilled today  -     Dulaglutide (TRULICITY) 1.5 YZ/7.3HC SOPN; Inject 1.5 mg into the skin once a week- refilled today  -     glipiZIDE (GLUCOTROL XL) 10 MG extended release tablet; Take 2 tablets by mouth daily- refilled today    Uncomplicated asthma, unspecified asthma severity, unspecified whether persistent  -     Well controlled. Rare use. - Continue current regimen. - albuterol sulfate  (90 Base) MCG/ACT inhaler; INHALE 2 PUFFS BY MOUTH EVERY SIX HOURS AS NEEDED FOR WHEEZING- refilled today    Mixed hyperlipidemia  -     Lifestyle modifications such as exercise, weight loss and healthy diet encouraged and reviewed with the pt. - gemfibrozil (LOPID) 600 MG tablet; Take 1 tablet by mouth 2 times daily- refilled today    Schizoaffective disorder, bipolar type ([facility] [address]   - Continue with psychiatry and psychology. Smoker   - Cessation recommended    Return in about 3 months (around 4/19/2021) for DM/HLD/asthma/mood f/u, or sooner if needed. Pt will call if symptoms worsen or fail to improve. All questions answered. Pt states no further questions or concerns at this time.    Electronically signed by: Robert Lunsford 01/19/21

## 2021-02-16 DIAGNOSIS — J45.909 UNCOMPLICATED ASTHMA, UNSPECIFIED ASTHMA SEVERITY, UNSPECIFIED WHETHER PERSISTENT: ICD-10-CM

## 2021-02-16 RX ORDER — ALBUTEROL SULFATE 90 UG/1
AEROSOL, METERED RESPIRATORY (INHALATION)
Qty: 18 G | Refills: 0 | Status: SHIPPED | OUTPATIENT
Start: 2021-02-16 | End: 2021-03-15

## 2021-03-04 DIAGNOSIS — E11.9 TYPE 2 DIABETES MELLITUS WITHOUT COMPLICATION, WITHOUT LONG-TERM CURRENT USE OF INSULIN (HCC): ICD-10-CM

## 2021-03-04 RX ORDER — GLIPIZIDE 10 MG/1
20 TABLET, FILM COATED, EXTENDED RELEASE ORAL DAILY
Qty: 60 TABLET | Refills: 0 | Status: SHIPPED | OUTPATIENT
Start: 2021-03-04 | End: 2021-03-30

## 2021-03-13 DIAGNOSIS — J45.909 UNCOMPLICATED ASTHMA, UNSPECIFIED ASTHMA SEVERITY, UNSPECIFIED WHETHER PERSISTENT: ICD-10-CM

## 2021-03-15 RX ORDER — ALBUTEROL SULFATE 90 UG/1
AEROSOL, METERED RESPIRATORY (INHALATION)
Qty: 18 G | Refills: 0 | Status: SHIPPED | OUTPATIENT
Start: 2021-03-15 | End: 2021-04-05

## 2021-03-30 DIAGNOSIS — E11.9 TYPE 2 DIABETES MELLITUS WITHOUT COMPLICATION, WITHOUT LONG-TERM CURRENT USE OF INSULIN (HCC): ICD-10-CM

## 2021-03-30 RX ORDER — GLIPIZIDE 10 MG/1
20 TABLET, FILM COATED, EXTENDED RELEASE ORAL DAILY
Qty: 60 TABLET | Refills: 0 | Status: SHIPPED | OUTPATIENT
Start: 2021-03-30 | End: 2021-04-20 | Stop reason: SDUPTHER

## 2021-04-05 DIAGNOSIS — J45.909 UNCOMPLICATED ASTHMA, UNSPECIFIED ASTHMA SEVERITY, UNSPECIFIED WHETHER PERSISTENT: ICD-10-CM

## 2021-04-05 RX ORDER — ALBUTEROL SULFATE 90 UG/1
AEROSOL, METERED RESPIRATORY (INHALATION)
Qty: 18 G | Refills: 0 | Status: SHIPPED | OUTPATIENT
Start: 2021-04-05 | End: 2021-04-20 | Stop reason: SDUPTHER

## 2021-04-16 NOTE — PROGRESS NOTES
Psychiatric/Behavioral: Negative for dysphoric mood, self-injury, sleep disturbance and suicidal ideas. The patient is not nervous/anxious. Allergies   Allergen Reactions    Latuda [Lurasidone Hcl] Anaphylaxis    Lisinopril Swelling and Palpitations     Current Outpatient Rx   Medication Sig Dispense Refill    Alcohol Swabs (B-D SINGLE USE SWABS REGULAR) PADS USE TO TEST BLOOD SUGAR DAILY 100 each 0    albuterol sulfate HFA (VENTOLIN HFA) 108 (90 Base) MCG/ACT inhaler INHALE 2 PUFFS BY MOUTH EVERY 6 HOURS AS NEEDED FOR WHEEZING 18 g 0    blood glucose monitor strips Test strips of choice to check blood sugar daily #90 day  E11.65 100 strip 0    dapagliflozin (FARXIGA) 5 MG tablet Take 1 tablet by mouth every morning 90 tablet 0    Dulaglutide (TRULICITY) 1.5 CD/9.0AJ SOPN Inject 1.5 mg into the skin once a week 2 mL 2    gemfibrozil (LOPID) 600 MG tablet Take 1 tablet by mouth 2 times daily 180 tablet 0    glipiZIDE (GLUCOTROL XL) 10 MG extended release tablet Take 2 tablets by mouth daily 60 tablet 0    Lancets (ONETOUCH DELICA PLUS [id number]) MISC use daily as directed 100 each 0    blood glucose monitor kit and supplies Test 1 time a day & as needed for symptoms of irregular blood glucose. 1 kit 0    folic acid-pyridoxine-cyanocobalamine (FOLTX) 2.5-25-1 MG TABS tablet Take 1 tablet by mouth daily      traZODone (DESYREL) 100 MG tablet Take 100 mg by mouth nightly 1-2 tabs nightly      paliperidone Palmitate (INVEGA TRINZA) 273 MG/0.875ML SUSP IM injection Inject 273 mg into the muscle once      clonazePAM (KLONOPIN) 0.5 MG tablet Take 0.5 mg by mouth 3 times daily as needed. Manuel Hendricks ziprasidone (GEODON) 60 MG capsule Take 60 mg by mouth every morning      FLUoxetine (PROZAC) 20 MG capsule Take 20 mg by mouth daily      hydrOXYzine (VISTARIL) 50 MG capsule Take 50 mg by mouth 4 times daily as needed for Itching      ziprasidone (GEODON) 80 MG capsule Take 80 mg by mouth 2 times daily (with meals)         Patient Active Problem List   Diagnosis    Overdose of antidepressant    Sch[facility]ia ([facility] [address]    Diabetes mellitus ([facility] [address]    Asthma    Hyperlipidemia        Wt Readings from Last 3 Encounters:   04/20/21 228 lb (103.4 kg)   10/06/20 235 lb (106.6 kg)   04/15/20 238 lb (108 kg)     BP Readings from Last 3 Encounters:   04/20/21 126/80   10/06/20 128/82   01/14/20 124/70     The 10-year ASCVD risk score (Chris Rice et al., 2013) is: 12.3%    Values used to calculate the score:      Age: 36 years      Sex: Female      Is Non- : No      Diabetic: Yes      Tobacco smoker: Yes      Systolic Blood Pressure: 316 mmHg      Is BP treated: No      HDL Cholesterol: 36 mg/dL      Total Cholesterol: 224 mg/dL    Objective/Physical Exam:  /80   Pulse 93   Wt 228 lb (103.4 kg)   SpO2 98%   BMI 33.67 kg/m²   Body mass index is 33.67 kg/m². Physical Exam  Vitals signs reviewed. Constitutional:       General: She is not in acute distress. Appearance: She is well-developed. She is not diaphoretic. HENT:      Head: Normocephalic and atraumatic. Eyes:      Pupils: Pupils are equal, round, and reactive to light. Cardiovascular:      Rate and Rhythm: Normal rate and regular rhythm. Pulmonary:      Effort: Pulmonary effort is normal. No respiratory distress. Breath sounds: Normal breath sounds. No wheezing or rales. Chest:      Chest wall: No tenderness. Skin:     General: Skin is warm and dry. Neurological:      Mental Status: She is alert and oriented to person, place, and time. Coordination: Coordination normal.   Psychiatric:         Mood and Affect: Mood normal.       Assessment and Plan:  Danielle Christian was seen today for diabetes and dental injury.     Diagnoses and all orders for this visit:    Type 2 diabetes mellitus without complication, without long-term current use of insulin (HCC)  -     POCT glycosylated hemoglobin (Hb A1C)-6.5%  - continue current regimen. -     Alcohol Swabs (B-D SINGLE USE SWABS REGULAR) PADS; USE TO TEST BLOOD SUGAR DAILY  -     blood glucose monitor strips; Test strips of choice to check blood sugar daily #90 day  E11.65  -     dapagliflozin (FARXIGA) 5 MG tablet; Take 1 tablet by mouth every morning  -     Dulaglutide (TRULICITY) 1.5 UH/6.6SV SOPN; Inject 1.5 mg into the skin once a week  -     glipiZIDE (GLUCOTROL XL) 10 MG extended release tablet; Take 2 tablets by mouth daily  -     Lancets (ONETOUCH DELICA PLUS [id number]) MISC; use daily as directed    Microalbuminuria   - Patient not agreeable to lisinopril or losartan d/t side effects. Uncomplicated asthma, unspecified asthma severity, unspecified whether persistent  -     Rare albuterol use per patient. - albuterol sulfate HFA (VENTOLIN HFA) 108 (90 Base) MCG/ACT inhaler; INHALE 2 PUFFS BY MOUTH EVERY 6 HOURS AS NEEDED FOR WHEEZING    Mixed hyperlipidemia  -    Continue medication as prescribed. Denies side effects.  - gemfibrozil (LOPID) 600 MG tablet; Take 1 tablet by mouth 2 times daily- refilled today    Schizoaffective disorder, bipolar type ([facility] [address]   - Continue with psychology and psychiatry. Smoker   - Cessation recommended. Patient states she will cut down to 1 pack/day by next visit. Pt reports that she thinks she has an infected tooth. When asked to see her tooth, she states it may not be infected. I reviewed that I would need to evaluate it. She states that it is not infected, but it is cracked. She states she would like a Z-Fernando. When I evaluated the tooth, there was a crack noted- however, no infection seen. Strongly recommended the patient schedule with her dentist.  Patient states she is agreeable to schedule with her dentist.    She then states that she would like a Z-Fernando prescribed for a cyst near her genital area. She states she gets these frequently through her OB/GYN. Recommend evaluation. The patient declines.   She states she is not interested in having this evaluated. Reviewed importance of evaluation to ensure the correct diagnosis/medication needed. She states she will call to schedule with her OB/GYN. Return in about 3 months (around 7/20/2021) for DM/asthma/HLD/smoker f/u, or sooner if needed. Pt will call if symptoms worsen or fail to improve. All questions answered. Pt states no further questions or concerns at this time.    Electronically signed by: Yessi Mathias 04/20/21

## 2021-04-20 ENCOUNTER — OFFICE VISIT (OUTPATIENT)
Dept: INTERNAL MEDICINE CLINIC | Age: 41
End: 2021-04-20
Payer: COMMERCIAL

## 2021-04-20 VITALS
HEART RATE: 93 BPM | WEIGHT: 228 LBS | BODY MASS INDEX: 33.67 KG/M2 | DIASTOLIC BLOOD PRESSURE: 80 MMHG | SYSTOLIC BLOOD PRESSURE: 126 MMHG | OXYGEN SATURATION: 98 %

## 2021-04-20 DIAGNOSIS — F25.0 SCHIZOAFFECTIVE DISORDER, BIPOLAR TYPE (HCC): ICD-10-CM

## 2021-04-20 DIAGNOSIS — J45.909 UNCOMPLICATED ASTHMA, UNSPECIFIED ASTHMA SEVERITY, UNSPECIFIED WHETHER PERSISTENT: ICD-10-CM

## 2021-04-20 DIAGNOSIS — F17.200 SMOKER: ICD-10-CM

## 2021-04-20 DIAGNOSIS — E11.9 TYPE 2 DIABETES MELLITUS WITHOUT COMPLICATION, WITHOUT LONG-TERM CURRENT USE OF INSULIN (HCC): Primary | ICD-10-CM

## 2021-04-20 DIAGNOSIS — E78.2 MIXED HYPERLIPIDEMIA: ICD-10-CM

## 2021-04-20 DIAGNOSIS — R80.9 MICROALBUMINURIA: ICD-10-CM

## 2021-04-20 LAB — HBA1C MFR BLD: 6.5 %

## 2021-04-20 PROCEDURE — 99214 OFFICE O/P EST MOD 30 MIN: CPT | Performed by: NURSE PRACTITIONER

## 2021-04-20 PROCEDURE — 83036 HEMOGLOBIN GLYCOSYLATED A1C: CPT | Performed by: NURSE PRACTITIONER

## 2021-04-20 RX ORDER — ISOPROPYL ALCOHOL 0.75 G/1
SWAB TOPICAL
Qty: 100 EACH | Refills: 0 | Status: SHIPPED | OUTPATIENT
Start: 2021-04-20 | End: 2021-08-12 | Stop reason: SDUPTHER

## 2021-04-20 RX ORDER — GLIPIZIDE 10 MG/1
20 TABLET, FILM COATED, EXTENDED RELEASE ORAL DAILY
Qty: 60 TABLET | Refills: 0 | Status: SHIPPED | OUTPATIENT
Start: 2021-04-20 | End: 2021-06-01

## 2021-04-20 RX ORDER — LANCETS 30 GAUGE
EACH MISCELLANEOUS
Qty: 100 EACH | Refills: 0 | Status: SHIPPED | OUTPATIENT
Start: 2021-04-20 | End: 2021-11-23 | Stop reason: SDUPTHER

## 2021-04-20 RX ORDER — DULAGLUTIDE 1.5 MG/.5ML
1.5 INJECTION, SOLUTION SUBCUTANEOUS WEEKLY
Qty: 2 ML | Refills: 2 | Status: SHIPPED | OUTPATIENT
Start: 2021-04-20 | End: 2021-08-12 | Stop reason: SDUPTHER

## 2021-04-20 RX ORDER — GEMFIBROZIL 600 MG/1
600 TABLET, FILM COATED ORAL 2 TIMES DAILY
Qty: 180 TABLET | Refills: 0 | Status: SHIPPED | OUTPATIENT
Start: 2021-04-20 | End: 2021-08-12 | Stop reason: SDUPTHER

## 2021-04-20 RX ORDER — GLUCOSAMINE HCL/CHONDROITIN SU 500-400 MG
CAPSULE ORAL
Qty: 100 STRIP | Refills: 0 | Status: SHIPPED | OUTPATIENT
Start: 2021-04-20 | End: 2021-08-12 | Stop reason: SDUPTHER

## 2021-04-20 RX ORDER — ALBUTEROL SULFATE 90 UG/1
AEROSOL, METERED RESPIRATORY (INHALATION)
Qty: 18 G | Refills: 0 | Status: SHIPPED | OUTPATIENT
Start: 2021-04-20 | End: 2021-05-05

## 2021-04-20 ASSESSMENT — ENCOUNTER SYMPTOMS
NAUSEA: 0
VOMITING: 0
COUGH: 0
ABDOMINAL PAIN: 0
WHEEZING: 0
CONSTIPATION: 0
SHORTNESS OF BREATH: 0
DIARRHEA: 0

## 2021-05-01 DIAGNOSIS — J45.909 UNCOMPLICATED ASTHMA, UNSPECIFIED ASTHMA SEVERITY, UNSPECIFIED WHETHER PERSISTENT: ICD-10-CM

## 2021-05-05 RX ORDER — ALBUTEROL SULFATE 90 UG/1
AEROSOL, METERED RESPIRATORY (INHALATION)
Qty: 18 G | Refills: 0 | Status: SHIPPED | OUTPATIENT
Start: 2021-05-05 | End: 2021-05-28

## 2021-05-28 DIAGNOSIS — J45.909 UNCOMPLICATED ASTHMA, UNSPECIFIED ASTHMA SEVERITY, UNSPECIFIED WHETHER PERSISTENT: ICD-10-CM

## 2021-05-28 RX ORDER — ALBUTEROL SULFATE 90 UG/1
AEROSOL, METERED RESPIRATORY (INHALATION)
Qty: 18 G | Refills: 0 | Status: SHIPPED | OUTPATIENT
Start: 2021-05-28 | End: 2021-07-20

## 2021-06-01 ENCOUNTER — TELEPHONE (OUTPATIENT)
Dept: INTERNAL MEDICINE CLINIC | Age: 41
End: 2021-06-01

## 2021-06-01 NOTE — TELEPHONE ENCOUNTER
Pt called asking for:    Glipizide 10 mg  2 qd     Gemfibrozil 600 mg  BID     Fariga 5 mg  1 qd    Albuterol inhaler   Just refilled  Wants refills on file. Trulicity   Weekly     But she reports she needs 3 months (asked if she was requesting 3 month supply or asking for 3 refills) she advised she picks her meds up monthly. [facility] on file. She reports that she is leaving for [place]. A loved one in in [place] and on a Vent. She is taking her mom up and will need these asap. Asked me to advise the staff and get a rush on this.      Last visit 4/20/21  Lab 4/20/21 A1C  Next visit 7/20/21

## 2021-06-01 NOTE — TELEPHONE ENCOUNTER
----- Message from Bell Armond sent at 6/1/2021  9:39 AM EDT -----  Subject: Message to Provider    QUESTIONS  Information for Provider? Patient would like nurse Maciej Dupont to give her a   call about medication refills. ---------------------------------------------------------------------------  --------------  Christian CEDEÑO  What is the best way for the office to contact you? OK to leave message on   voicemail  Preferred Call Back Phone Number? [phone]  ---------------------------------------------------------------------------  --------------  SCRIPT ANSWERS  Relationship to Patient?  Self

## 2021-07-20 DIAGNOSIS — J45.909 UNCOMPLICATED ASTHMA, UNSPECIFIED ASTHMA SEVERITY, UNSPECIFIED WHETHER PERSISTENT: ICD-10-CM

## 2021-07-20 RX ORDER — ALBUTEROL SULFATE 90 UG/1
AEROSOL, METERED RESPIRATORY (INHALATION)
Qty: 18 G | Refills: 0 | Status: SHIPPED | OUTPATIENT
Start: 2021-07-20 | End: 2021-08-11

## 2021-07-24 DIAGNOSIS — E11.9 TYPE 2 DIABETES MELLITUS WITHOUT COMPLICATION, WITHOUT LONG-TERM CURRENT USE OF INSULIN (HCC): ICD-10-CM

## 2021-07-25 DIAGNOSIS — E11.9 TYPE 2 DIABETES MELLITUS WITHOUT COMPLICATION, WITHOUT LONG-TERM CURRENT USE OF INSULIN (HCC): ICD-10-CM

## 2021-07-27 RX ORDER — GLIPIZIDE 10 MG/1
20 TABLET, FILM COATED, EXTENDED RELEASE ORAL DAILY
Qty: 60 TABLET | Refills: 1 | Status: SHIPPED | OUTPATIENT
Start: 2021-07-27 | End: 2021-07-30 | Stop reason: SDUPTHER

## 2021-07-27 RX ORDER — GLIPIZIDE 10 MG/1
20 TABLET, FILM COATED, EXTENDED RELEASE ORAL DAILY
Qty: 60 TABLET | Refills: 1 | OUTPATIENT
Start: 2021-07-27

## 2021-07-28 DIAGNOSIS — E11.9 TYPE 2 DIABETES MELLITUS WITHOUT COMPLICATION, WITHOUT LONG-TERM CURRENT USE OF INSULIN (HCC): ICD-10-CM

## 2021-07-28 RX ORDER — GLIPIZIDE 10 MG/1
20 TABLET, FILM COATED, EXTENDED RELEASE ORAL DAILY
Qty: 60 TABLET | Refills: 1 | OUTPATIENT
Start: 2021-07-28

## 2021-07-30 ENCOUNTER — TELEPHONE (OUTPATIENT)
Dept: INTERNAL MEDICINE CLINIC | Age: 41
End: 2021-07-30

## 2021-07-30 DIAGNOSIS — E11.9 TYPE 2 DIABETES MELLITUS WITHOUT COMPLICATION, WITHOUT LONG-TERM CURRENT USE OF INSULIN (HCC): ICD-10-CM

## 2021-07-30 RX ORDER — GLIPIZIDE 10 MG/1
20 TABLET, FILM COATED, EXTENDED RELEASE ORAL DAILY
Qty: 60 TABLET | Refills: 1 | Status: SHIPPED | OUTPATIENT
Start: 2021-07-30 | End: 2021-07-30

## 2021-07-30 RX ORDER — GLIPIZIDE 10 MG/1
20 TABLET, FILM COATED, EXTENDED RELEASE ORAL DAILY
Qty: 180 TABLET | Refills: 0 | Status: SHIPPED | OUTPATIENT
Start: 2021-07-30 | End: 2021-08-12 | Stop reason: SDUPTHER

## 2021-07-30 NOTE — TELEPHONE ENCOUNTER
Kapil calling about the glipazide---they have never received the script sent 7/27---can you please resend. Kapil Hough---Thanks.

## 2021-08-11 DIAGNOSIS — J45.909 UNCOMPLICATED ASTHMA, UNSPECIFIED ASTHMA SEVERITY, UNSPECIFIED WHETHER PERSISTENT: ICD-10-CM

## 2021-08-11 RX ORDER — ALBUTEROL SULFATE 90 UG/1
AEROSOL, METERED RESPIRATORY (INHALATION)
Qty: 18 G | Refills: 0 | Status: SHIPPED | OUTPATIENT
Start: 2021-08-11 | End: 2021-08-12 | Stop reason: SDUPTHER

## 2021-08-12 ENCOUNTER — OFFICE VISIT (OUTPATIENT)
Dept: INTERNAL MEDICINE CLINIC | Age: 41
End: 2021-08-12
Payer: COMMERCIAL

## 2021-08-12 VITALS
OXYGEN SATURATION: 97 % | DIASTOLIC BLOOD PRESSURE: 60 MMHG | HEART RATE: 96 BPM | BODY MASS INDEX: 34 KG/M2 | HEIGHT: 69 IN | WEIGHT: 229.6 LBS | SYSTOLIC BLOOD PRESSURE: 122 MMHG

## 2021-08-12 DIAGNOSIS — Z00.00 ANNUAL PHYSICAL EXAM: Primary | ICD-10-CM

## 2021-08-12 DIAGNOSIS — R80.9 MICROALBUMINURIA: ICD-10-CM

## 2021-08-12 DIAGNOSIS — E11.9 TYPE 2 DIABETES MELLITUS WITHOUT COMPLICATION, WITHOUT LONG-TERM CURRENT USE OF INSULIN (HCC): ICD-10-CM

## 2021-08-12 DIAGNOSIS — F17.200 SMOKER: ICD-10-CM

## 2021-08-12 DIAGNOSIS — F25.0 SCHIZOAFFECTIVE DISORDER, BIPOLAR TYPE (HCC): ICD-10-CM

## 2021-08-12 DIAGNOSIS — E78.2 MIXED HYPERLIPIDEMIA: ICD-10-CM

## 2021-08-12 DIAGNOSIS — J45.909 UNCOMPLICATED ASTHMA, UNSPECIFIED ASTHMA SEVERITY, UNSPECIFIED WHETHER PERSISTENT: ICD-10-CM

## 2021-08-12 LAB — HBA1C MFR BLD: 6.8 %

## 2021-08-12 PROCEDURE — 83036 HEMOGLOBIN GLYCOSYLATED A1C: CPT | Performed by: NURSE PRACTITIONER

## 2021-08-12 PROCEDURE — 99214 OFFICE O/P EST MOD 30 MIN: CPT | Performed by: NURSE PRACTITIONER

## 2021-08-12 RX ORDER — ALBUTEROL SULFATE 90 UG/1
AEROSOL, METERED RESPIRATORY (INHALATION)
Qty: 18 G | Refills: 0 | Status: SHIPPED | OUTPATIENT
Start: 2021-08-12 | End: 2021-09-03

## 2021-08-12 RX ORDER — ISOPROPYL ALCOHOL 0.75 G/1
SWAB TOPICAL
Qty: 100 EACH | Refills: 0 | Status: SHIPPED | OUTPATIENT
Start: 2021-08-12 | End: 2021-11-23

## 2021-08-12 RX ORDER — DULAGLUTIDE 1.5 MG/.5ML
1.5 INJECTION, SOLUTION SUBCUTANEOUS WEEKLY
Qty: 2 ML | Refills: 2 | Status: SHIPPED | OUTPATIENT
Start: 2021-08-12 | End: 2021-10-12 | Stop reason: DRUGHIGH

## 2021-08-12 RX ORDER — GLUCOSAMINE HCL/CHONDROITIN SU 500-400 MG
CAPSULE ORAL
Qty: 100 STRIP | Refills: 0 | Status: SHIPPED | OUTPATIENT
Start: 2021-08-12 | End: 2022-02-16

## 2021-08-12 RX ORDER — GLIPIZIDE 10 MG/1
20 TABLET, FILM COATED, EXTENDED RELEASE ORAL DAILY
Qty: 180 TABLET | Refills: 0 | Status: SHIPPED | OUTPATIENT
Start: 2021-08-12 | End: 2021-10-12 | Stop reason: SINTOL

## 2021-08-12 RX ORDER — GEMFIBROZIL 600 MG/1
600 TABLET, FILM COATED ORAL 2 TIMES DAILY
Qty: 180 TABLET | Refills: 0 | Status: SHIPPED | OUTPATIENT
Start: 2021-08-12 | End: 2021-11-23

## 2021-08-12 RX ORDER — NICOTINE 21 MG/24HR
1 PATCH, TRANSDERMAL 24 HOURS TRANSDERMAL DAILY
Qty: 42 PATCH | Refills: 0 | Status: SHIPPED | OUTPATIENT
Start: 2021-08-12 | End: 2022-02-16

## 2021-08-12 SDOH — ECONOMIC STABILITY: FOOD INSECURITY: WITHIN THE PAST 12 MONTHS, THE FOOD YOU BOUGHT JUST DIDN'T LAST AND YOU DIDN'T HAVE MONEY TO GET MORE.: NEVER TRUE

## 2021-08-12 SDOH — ECONOMIC STABILITY: FOOD INSECURITY: WITHIN THE PAST 12 MONTHS, YOU WORRIED THAT YOUR FOOD WOULD RUN OUT BEFORE YOU GOT MONEY TO BUY MORE.: NEVER TRUE

## 2021-08-12 ASSESSMENT — PATIENT HEALTH QUESTIONNAIRE - PHQ9
9. THOUGHTS THAT YOU WOULD BE BETTER OFF DEAD, OR OF HURTING YOURSELF: 0
5. POOR APPETITE OR OVEREATING: 1
SUM OF ALL RESPONSES TO PHQ QUESTIONS 1-9: 5
SUM OF ALL RESPONSES TO PHQ QUESTIONS 1-9: 5
6. FEELING BAD ABOUT YOURSELF - OR THAT YOU ARE A FAILURE OR HAVE LET YOURSELF OR YOUR FAMILY DOWN: 0
8. MOVING OR SPEAKING SO SLOWLY THAT OTHER PEOPLE COULD HAVE NOTICED. OR THE OPPOSITE, BEING SO FIGETY OR RESTLESS THAT YOU HAVE BEEN MOVING AROUND A LOT MORE THAN USUAL: 0
2. FEELING DOWN, DEPRESSED OR HOPELESS: 1
7. TROUBLE CONCENTRATING ON THINGS, SUCH AS READING THE NEWSPAPER OR WATCHING TELEVISION: 0
3. TROUBLE FALLING OR STAYING ASLEEP: 1
SUM OF ALL RESPONSES TO PHQ9 QUESTIONS 1 & 2: 2
1. LITTLE INTEREST OR PLEASURE IN DOING THINGS: 1
4. FEELING TIRED OR HAVING LITTLE ENERGY: 1
10. IF YOU CHECKED OFF ANY PROBLEMS, HOW DIFFICULT HAVE THESE PROBLEMS MADE IT FOR YOU TO DO YOUR WORK, TAKE CARE OF THINGS AT HOME, OR GET ALONG WITH OTHER PEOPLE: 0
SUM OF ALL RESPONSES TO PHQ QUESTIONS 1-9: 5

## 2021-08-12 ASSESSMENT — ENCOUNTER SYMPTOMS
COUGH: 0
VOMITING: 0
DIARRHEA: 0
NAUSEA: 0
SHORTNESS OF BREATH: 0
ABDOMINAL PAIN: 0
CONSTIPATION: 0
WHEEZING: 0

## 2021-08-12 ASSESSMENT — SOCIAL DETERMINANTS OF HEALTH (SDOH): HOW HARD IS IT FOR YOU TO PAY FOR THE VERY BASICS LIKE FOOD, HOUSING, MEDICAL CARE, AND HEATING?: NOT HARD AT ALL

## 2021-08-12 NOTE — PROGRESS NOTES
Office Visit  8/12/2021    Subjective:  Chief Complaint   Patient presents with    3 Month Follow-Up     DM/asthma/HLD/smoker     HPI:  Paul Harris is a 39 y.o. female who presents to the clinic today for an annual exam.    Microalbuminuria-noted on 9/30/2020.  Lisinopril and losartan were both trialed separately. Side effects occurred. She stopped and her symptoms completely resolved off of this medication. She is not interested in another ACE or ARB.    DM- Prescribed Glipizide ER 20 mg daily and Trulicity 1.5 mg SQ weekly and farxiga 5 mg daily.   Metformin caused \"explosive diarrhea. \" Jardiance had side effects. Fasting sugars are running 120. No episodes of hypoglycemia. Asymptomatic.   Not exercising. Diet is reported as \"I snack throughout the day. \"      Asthma- childhood asthma per pt.  Uses albuterol as needed with relief- using 1x/week. Denies trouble breathing/SOB/wheezing.      Hyperlipidemiathe patient is taking gemfibrozil 600 mg by mouth twice daily without side effects.  She would like to continue this medication at this time.      Mood- Continues to follow with Maggie Vanegas. Following with psychiatry and psychology. Stable at this time per pt. Denies SI/HI. States her stepdad \"dropped dead of a heart attack. \"      Smoking - 2 ppd. Wants to cut down. Pt is unemployed. She overdosed in 2001 and had to stay in a psych [facility] and transitioned to a transitional care. Since, she has not worked. She enjoys watching movies, reading books and playing cards. Her mother and sister live in [place] and she lives alone in [place]. Review of Systems   Constitutional: Negative for chills, fatigue, fever and unexpected weight change. Eyes: Negative for visual disturbance. Respiratory: Negative for cough, shortness of breath and wheezing. Cardiovascular: Negative for chest pain, palpitations and leg swelling.    Gastrointestinal: Negative for abdominal pain, constipation, diarrhea, nausea and vomiting. Skin: Negative for pallor and rash. Neurological: Negative for dizziness, weakness, light-headedness, numbness and headaches. Psychiatric/Behavioral: Negative for dysphoric mood, self-injury, sleep disturbance and suicidal ideas. The patient is not nervous/anxious.       Allergies   Allergen Reactions    Latuda [Lurasidone Hcl] Anaphylaxis    Lisinopril Swelling and Palpitations     Family History   Problem Relation Age of Onset    Depression Mother         in remission    Alcohol Abuse Mother         history    Osteoporosis Mother    Terese Outhouse Arthritis Mother         RA   Terese Outjoão Other Mother         arthritis    Alcohol Abuse Father     High Blood Pressure Father     Asthma Sister         childhood    Alcohol Abuse Sister     Breast Cancer Maternal Aunt     Lung Cancer Maternal Grandmother     Alcohol Abuse Maternal Grandfather     Cancer Maternal Grandfather     Mental Illness Maternal Grandfather         undiagnosed    Colon Cancer Maternal Grandfather     Esophageal Cancer Maternal Grandfather     Alcohol Abuse Paternal Grandmother     Hearing Loss Paternal Grandmother     Stroke Paternal Grandmother     Alzheimer's Disease Paternal Grandmother     Kidney Disease Paternal Grandfather     No Known Problems Sister     Ovarian Cancer Neg Hx     Heart Attack Neg Hx      Current Outpatient Rx   Medication Sig Dispense Refill    nicotine (NICODERM CQ) 21 MG/24HR Place 1 patch onto the skin daily 42 patch 0    dapagliflozin (FARXIGA) 5 MG tablet TAKE 1 TABLET BY MOUTH EVERY MORNING 90 tablet 0    Dulaglutide (TRULICITY) 1.5 UU/1.2PP SOPN Inject 1.5 mg into the skin once a week 2 mL 2    glipiZIDE (GLUCOTROL XL) 10 MG extended release tablet Take 2 tablets by mouth daily 180 tablet 0    gemfibrozil (LOPID) 600 MG tablet Take 1 tablet by mouth 2 times daily 180 tablet 0    albuterol sulfate  (90 Base) MCG/ACT inhaler INHALE 2 PUFFS BY MOUTH EVERY 6 HOURS AS NEEDED FOR WHEEZING 18 g 0    blood glucose monitor strips Test strips of choice to check blood sugar daily #90 day  E11.65 100 strip 0    Alcohol Swabs (B-D SINGLE USE SWABS REGULAR) PADS USE TO TEST BLOOD SUGAR DAILY 100 each 0    Lancets (ONETOUCH DELICA PLUS [id number]) MISC use daily as directed 100 each 0    blood glucose monitor kit and supplies Test 1 time a day & as needed for symptoms of irregular blood glucose. 1 kit 0    folic acid-pyridoxine-cyanocobalamine (FOLTX) 2.5-25-1 MG TABS tablet Take 1 tablet by mouth daily      traZODone (DESYREL) 100 MG tablet Take 100 mg by mouth nightly 1-2 tabs nightly      paliperidone Palmitate (INVEGA TRINZA) 273 MG/0.875ML SUSP IM injection Inject 273 mg into the muscle once      clonazePAM (KLONOPIN) 0.5 MG tablet Take 0.5 mg by mouth 3 times daily as needed. [place] ziprasidone (GEODON) 60 MG capsule Take 60 mg by mouth every morning      FLUoxetine (PROZAC) 20 MG capsule Take 20 mg by mouth daily      hydrOXYzine (VISTARIL) 50 MG capsule Take 50 mg by mouth 4 times daily as needed for Itching      ziprasidone (GEODON) 80 MG capsule Take 80 mg by mouth 2 times daily (with meals)       Social History     Socioeconomic History    Marital status: Single     Spouse name: Not on file    Number of children: Not on file    Years of education: 15    Highest education level: Not on file   Occupational History    Not on file   Tobacco Use    Smoking status: Current Every Day Smoker     Packs/day: 2.00     Years: 23.00     Pack years: 46.00     Types: Cigarettes     Start date: 4/10/1997    Smokeless tobacco: Never Used   Vaping Use    Vaping Use: Former   Substance and Sexual Activity    Alcohol use: Not Currently    Drug use: No     Types: Marijuana     Comment: history of marijuana (last 5 years ago) and cocaine use (last in her 25s)    Sexual activity: Not Currently     Partners: Male   Other Topics Concern    Not on file   Social History Narrative    Pt is unemployed. She overdosed in 2001 and had to stay in a psych [facility] and transitioned to a transitional care. Since, she has not worked. She enjoys watching movies, reading books and playing cards. Her mother and sister live in [facility] and she lives alone in [place]. Social Determinants of Health     Financial Resource Strain: Low Risk     Difficulty of Paying Living Expenses: Not hard at all   Food Insecurity: No Food Insecurity    Worried About Running Out of Food in the Last Year: Never true    Missael of Food in the Last Year: Never true   Transportation Needs:     Lack of Transportation (Medical):      Lack of Transportation (Non-Medical):    Physical Activity:     Days of Exercise per Week:     Minutes of Exercise per Session:    Stress:     Feeling of Stress :    Social Connections:     Frequency of Communication with Friends and Family:     Frequency of Social Gatherings with Friends and Family:     Attends Alevism Services:     Active Member of Clubs or Organizations:     Attends Club or Organization Meetings:     Marital Status:    Intimate Partner Violence:     Fear of Current or Ex-Partner:     Emotionally Abused:     Physically Abused:     Sexually Abused:      Past Medical History:   Diagnosis Date    Asthma     Diabetes mellitus ([facility]     Erb's palsy     left arm    Hyperlipidemia     PTSD (post-traumatic stress disorder)     Schizoaffective disorder ([facility] [facility]     diag 13years old     Patient Active Problem List   Diagnosis    Overdose of antidepressant    Schizophrenia ([facility]    Diabetes mellitus ([facility]    Asthma    Hyperlipidemia      Wt Readings from Last 3 Encounters:   08/12/21 229 lb 9.6 oz (104.1 kg)   04/20/21 228 lb (103.4 kg)   10/06/20 235 lb (106.6 kg)     BP Readings from Last 3 Encounters:   08/12/21 122/60   04/20/21 126/80   10/06/20 128/82     The 10-year ASCVD risk score (Shola De León, et al., 2013) is: 11.4%    Values used to calculate the score:      Age: 39 years Sex: Female      Is Non- : No      Diabetic: Yes      Tobacco smoker: Yes      Systolic Blood Pressure: 440 mmHg      Is BP treated: No      HDL Cholesterol: 36 mg/dL      Total Cholesterol: 224 mg/dL    PHQ-9 Total Score: 5 (8/12/2021 12:18 PM)  Thoughts that you would be better off dead, or of hurting yourself in some way: 0 (8/12/2021 12:18 PM)    Objective/Physical Exam:  /60   Pulse 96   Ht 5' 9\" (1.753 m)   Wt 229 lb 9.6 oz (104.1 kg)   SpO2 97%   BMI 33.91 kg/m²   Body mass index is 33.91 kg/m². Physical Exam  Vitals reviewed. Constitutional:       General: She is not in acute distress. Appearance: She is well-developed. She is not diaphoretic. HENT:      Head: Normocephalic and atraumatic. Eyes:      Pupils: Pupils are equal, round, and reactive to light. Cardiovascular:      Rate and Rhythm: Normal rate and regular rhythm. Pulmonary:      Effort: Pulmonary effort is normal. No respiratory distress. Breath sounds: Normal breath sounds. No wheezing or rales. Chest:      Chest wall: No tenderness. Abdominal:      General: Bowel sounds are normal.      Palpations: Abdomen is soft. Skin:     General: Skin is warm and dry. Neurological:      Mental Status: She is alert and oriented to person, place, and time. Coordination: Coordination normal.   Psychiatric:         Mood and Affect: Mood normal.       Assessment and Plan:  Doc Lechuga was seen today for 3 month follow-up.     Diagnoses and all orders for this visit:    Annual physical exam   - Labs ordered    Type 2 diabetes mellitus without complication, without long-term current use of insulin (HCC)/Microalbuminuria   -     POCT glycosylated hemoglobin (Hb A1C)- 6.8  - Continue current medication regimen  - Lifestyle modifications such as exercise, weight loss and healthy diet encouraged and reviewed with the pt.  -     dapagliflozin (FARXIGA) 5 MG tablet; TAKE 1 TABLET BY MOUTH EVERY MORNING  - Dulaglutide (TRULICITY) 1.5 IT/0.2BE SOPN; Inject 1.5 mg into the skin once a week  -     glipiZIDE (GLUCOTROL XL) 10 MG extended release tablet; Take 2 tablets by mouth daily  -     blood glucose monitor strips; Test strips of choice to check blood sugar daily #90 day  E11.65  -     Alcohol Swabs (B-D SINGLE USE SWABS REGULAR) PADS; USE TO TEST BLOOD SUGAR DAILY  -     MICROALBUMIN / CREATININE URINE RATIO; Future  -     COMPREHENSIVE METABOLIC PANEL; Future  -     CBC Auto Differential; Future  -     TSH WITH REFLEX TO FT4; Future    Uncomplicated asthma, unspecified asthma severity, unspecified whether persistent  -     Taking as needed without side effects.  - Continue current regimen  - albuterol sulfate  (90 Base) MCG/ACT inhaler; INHALE 2 PUFFS BY MOUTH EVERY 6 HOURS AS NEEDED FOR WHEEZING    Mixed hyperlipidemia  -    Well-controlled without side effects. Will reevaluate  - Continue current regimen  - gemfibrozil (LOPID) 600 MG tablet; Take 1 tablet by mouth 2 times daily  -     Lipid, Fasting; Future    Schizoaffective disorder, bipolar type ([facility] [address]  -     Continue with psychiatry  - TSH WITH REFLEX TO FT4; Future    Smoker  -     Cessation recommended. Options were reviewed. Patient agreeable to nicotine patches. - nicotine (NICODERM CQ) 21 MG/24HR; Place 1 patch onto the skin daily- use and side effects reviewed. -     CBC Auto Differential; Future    40 minutes were spent reviewing the chart, coordinating care of the patient and counseling face to face with the patient. Return for 6 weeks AWV / smoking cessation f/u and 3 month DM/asthma/HLD f/u, or sooner if needed. Pt will call if symptoms worsen or fail to improve. All questions answered. Pt states no further questions or concerns at this time.    Electronically signed by: Dorrie Schlatter, APRN - CNP 08/12/21

## 2021-09-03 DIAGNOSIS — J45.909 UNCOMPLICATED ASTHMA, UNSPECIFIED ASTHMA SEVERITY, UNSPECIFIED WHETHER PERSISTENT: ICD-10-CM

## 2021-09-03 RX ORDER — ALBUTEROL SULFATE 90 UG/1
AEROSOL, METERED RESPIRATORY (INHALATION)
Qty: 18 G | Refills: 0 | Status: SHIPPED | OUTPATIENT
Start: 2021-09-03 | End: 2021-10-26

## 2021-09-24 DIAGNOSIS — E11.9 TYPE 2 DIABETES MELLITUS WITHOUT COMPLICATION, WITHOUT LONG-TERM CURRENT USE OF INSULIN (HCC): ICD-10-CM

## 2021-09-24 RX ORDER — GLIPIZIDE 10 MG/1
20 TABLET, FILM COATED, EXTENDED RELEASE ORAL DAILY
Qty: 60 TABLET | OUTPATIENT
Start: 2021-09-24

## 2021-10-07 ENCOUNTER — TELEPHONE (OUTPATIENT)
Dept: INTERNAL MEDICINE CLINIC | Age: 41
End: 2021-10-07

## 2021-10-07 NOTE — TELEPHONE ENCOUNTER
Pt states she is having a reaction to the glipizide. Says the pharmacy switched /ingredients for her Glipizide. Says she has been looking at other pharmacies that may have the correct brand but with no luck. Was told by the pharmacy it was made by [employer]. Please advise?

## 2021-10-07 NOTE — TELEPHONE ENCOUNTER
Pt calling having a reaction to her Glipizide---said it was made by different  than what she usually gets---having trouble tracking down the correct one ---please call pt. Thanks.

## 2021-10-07 NOTE — TELEPHONE ENCOUNTER
I am happy to send the medication to another pharmacy if she would like. Please let me know where she would like it sent.

## 2021-10-08 NOTE — TELEPHONE ENCOUNTER
Pt states that with the new glipizide made her heart race, coughing, hard to swallow, felt like her tongue was swollen. Not able to find that specific . Is there anything she can take to replace it?

## 2021-10-12 ENCOUNTER — VIRTUAL VISIT (OUTPATIENT)
Dept: INTERNAL MEDICINE CLINIC | Age: 41
End: 2021-10-12
Payer: COMMERCIAL

## 2021-10-12 DIAGNOSIS — E11.9 TYPE 2 DIABETES MELLITUS WITHOUT COMPLICATION, WITHOUT LONG-TERM CURRENT USE OF INSULIN (HCC): ICD-10-CM

## 2021-10-12 PROCEDURE — 99213 OFFICE O/P EST LOW 20 MIN: CPT | Performed by: NURSE PRACTITIONER

## 2021-10-12 RX ORDER — DULAGLUTIDE 3 MG/.5ML
3 INJECTION, SOLUTION SUBCUTANEOUS WEEKLY
Qty: 2 ML | Refills: 1 | Status: SHIPPED | OUTPATIENT
Start: 2021-10-12 | End: 2021-11-23 | Stop reason: SDUPTHER

## 2021-10-12 ASSESSMENT — ENCOUNTER SYMPTOMS
SHORTNESS OF BREATH: 0
WHEEZING: 0
COUGH: 0

## 2021-10-12 NOTE — PROGRESS NOTES
TELEHEALTH EVALUATION -- Audio/Visual (During EDFZN-34 public health emergency)  Acute Office Visit  10/12/2021    SUBJECTIVE:    Patient ID: Svetlana Frey is a 39 y.o. female. Chief Complaint   Patient presents with    Discuss Medications     on new , had cough, heart racing, hard to breath and tongue felt swollen. quit taking this new glipizide and no longer having any sx     HPI: The patient presents for an acute visit. Patient reports that she was taking her glipizide and it was working well for diabetes. However, the pharmacy changed manufacturers and she states she is allergic to the new generic pill they prescribed. She states she cannot find the old one. She reports that when she was taking this new  of glipizide, she was having a hard time swallowing and had a cough with a swollen tongue. States she \"felt like I had anaphylaxis. \"  When she stopped the glipizide, her symptoms resolved \"instantaneously. \" She is not interested in continuing on glipizide. DM- Prescribed Glipizide ER 20 mg daily and Trulicity 1.5 mg SQ weekly and farxiga 5 mg daily.   Metformin caused \"explosive diarrhea. \" Jardiance had side effects. Glipizide now causes side effects.   Not monitoring sugars. Denies episodes of hypoglycemia.     Allergies   Allergen Reactions    Latuda [Lurasidone Hcl] Anaphylaxis    Lisinopril Swelling and Palpitations     Current Outpatient Medications   Medication Sig Dispense Refill    Dulaglutide (TRULICITY) 3 SG/6.0II SOPN Inject 3 mg into the skin once a week 2 mL 1    albuterol sulfate  (90 Base) MCG/ACT inhaler INHALE 2 PUFFS BY MOUTH EVERY 6 HOURS AS NEEDED FOR WHEEZING 18 g 0    dapagliflozin (FARXIGA) 5 MG tablet TAKE 1 TABLET BY MOUTH EVERY MORNING 90 tablet 0    gemfibrozil (LOPID) 600 MG tablet Take 1 tablet by mouth 2 times daily 180 tablet 0    blood glucose monitor strips Test strips of choice to check blood sugar daily #90 day  E11.65 100 strip 0    Alcohol Swabs (B-D SINGLE USE SWABS REGULAR) PADS USE TO TEST BLOOD SUGAR DAILY 100 each 0    Lancets (ONETOUCH DELICA PLUS [id number]) MISC use daily as directed 100 each 0    blood glucose monitor kit and supplies Test 1 time a day & as needed for symptoms of irregular blood glucose. 1 kit 0    folic acid-pyridoxine-cyanocobalamine (FOLTX) 2.5-25-1 MG TABS tablet Take 1 tablet by mouth daily      traZODone (DESYREL) 100 MG tablet Take 100 mg by mouth nightly 1-2 tabs nightly      paliperidone Palmitate (INVEGA TRINZA) 273 MG/0.875ML SUSP IM injection Inject 273 mg into the muscle once      clonazePAM (KLONOPIN) 0.5 MG tablet Take 0.5 mg by mouth 3 times daily as needed. [place] ziprasidone (GEODON) 60 MG capsule Take 60 mg by mouth every morning      FLUoxetine (PROZAC) 20 MG capsule Take 20 mg by mouth daily      hydrOXYzine (VISTARIL) 50 MG capsule Take 50 mg by mouth 4 times daily as needed for Itching      ziprasidone (GEODON) 80 MG capsule Take 80 mg by mouth 2 times daily (with meals)      nicotine (NICODERM CQ) 21 MG/24HR Place 1 patch onto the skin daily 42 patch 0     No current facility-administered medications for this visit. Review of Systems   Constitutional: Negative for chills, fatigue, fever and unexpected weight change. Eyes: Negative for visual disturbance. Respiratory: Negative for cough, shortness of breath and wheezing. Cardiovascular: Negative for chest pain, palpitations and leg swelling. Skin: Negative for pallor and rash. Neurological: Negative for dizziness, weakness, light-headedness, numbness and headaches. OBJECTIVE:  Video Virtual Visit  Patient-Reported Vitals 1/19/2021   Patient-Reported Weight -   Patient-Reported Height -   Patient-Reported Systolic -   Patient-Reported Diastolic -   Patient-Reported Pulse 100   Patient-Reported Temperature -    ^no access to other VS d/t VV per pt.   Physical Exam  Constitutional:       General: She is not in acute distress. Appearance: Normal appearance. She is not ill-appearing. Pulmonary:      Effort: Pulmonary effort is normal. No respiratory distress. Skin:     Coloration: Skin is not jaundiced or pale. Neurological:      Mental Status: She is alert. Comments: No facial asymmetry noted   Psychiatric:         Mood and Affect: Mood normal.      Due to this being a TeleHealth encounter, evaluation of the following organ systems is limited: Vitals/Constitutional/EENT/Resp/CV/GI//MS/Neuro/Skin/Heme-Lymph-Imm. ASSESSMENT/PLAN:  Bess Boswell was seen today for discuss medications. Diagnoses and all orders for this visit:    Type 2 diabetes mellitus without complication, without long-term current use of insulin (HCC)  -     Side effects on a certain  pill of glipizide. Symptoms resolved after stopping glipizide. - Denies side effects on other medications. Asymptomatic at this time.  - Recommend d/c glipizide and increase trulicity. Pt agreeable  - Dulaglutide (TRULICITY) 3 NS/4.6IO SOPN; Inject 3 mg into the skin once a week- increase dose  - Recommend pt monitor sugars and keep a log. Return in about 3 weeks (around 11/2/2021) for AWV and DM/asthma/HLD f/u, or sooner if needed. Sue Mesa is a 39 y.o. female being evaluated by a Virtual Visit (video visit) encounter to address concerns as mentioned above. A caregiver was present when appropriate. Due to this being a TeleHealth encounter (During [facility]- public health emergency), evaluation of the following organ systems was limited: Vitals/Constitutional/EENT/Resp/CV/GI//MS/Neuro/Skin/Heme-Lymph-Imm.   Pursuant to the emergency declaration under the [facility] authority and the [employer] Act, this Virtual Visit was conducted with patient's (and/or legal guardian's) consent, to reduce the patient's risk of exposure to COVID-19 and provide necessary medical care. The patient (and/or legal guardian) has also been advised to contact this office for worsening conditions or problems, and seek emergency medical treatment and/or call 911 if deemed necessary. Consent:  He and/or health care decision maker is aware that that he may receive a bill for this virtual service, depending on his insurance coverage, and has provided verbal consent to proceed: Yes    Patient identification was verified at the start of the visit: Yes    Total time spent on this encounter: Not billed by time    Services were provided through a video synchronous discussion virtually to substitute for in-person clinic visit. Patient and provider were located at their individual homes. All questions answered. Pt states no further questions or concerns at this time.    Electronically signed by: ARTI Salinas CNP 10/12/21

## 2021-10-13 ENCOUNTER — TELEPHONE (OUTPATIENT)
Dept: ADMINISTRATIVE | Age: 41
End: 2021-10-13

## 2021-10-13 NOTE — TELEPHONE ENCOUNTER
Submitted PA for Trulicity 7UF/1.3LR pen-injectors. Key: Yoli Plate. Via [facility] STATUS: No PA is required. Will scan letter when received. Please notify patient, thank you.

## 2021-10-23 DIAGNOSIS — J45.909 UNCOMPLICATED ASTHMA, UNSPECIFIED ASTHMA SEVERITY, UNSPECIFIED WHETHER PERSISTENT: ICD-10-CM

## 2021-10-26 RX ORDER — ALBUTEROL SULFATE 90 UG/1
AEROSOL, METERED RESPIRATORY (INHALATION)
Qty: 18 G | Refills: 0 | Status: SHIPPED | OUTPATIENT
Start: 2021-10-26 | End: 2021-11-23 | Stop reason: SDUPTHER

## 2021-11-05 DIAGNOSIS — E78.2 MIXED HYPERLIPIDEMIA: ICD-10-CM

## 2021-11-05 DIAGNOSIS — F17.200 SMOKER: ICD-10-CM

## 2021-11-05 DIAGNOSIS — E11.9 TYPE 2 DIABETES MELLITUS WITHOUT COMPLICATION, WITHOUT LONG-TERM CURRENT USE OF INSULIN (HCC): ICD-10-CM

## 2021-11-05 DIAGNOSIS — F25.0 SCHIZOAFFECTIVE DISORDER, BIPOLAR TYPE (HCC): ICD-10-CM

## 2021-11-05 LAB
A/G RATIO: 1.5 (ref 1.1–2.2)
ALBUMIN SERPL-MCNC: 4.8 G/DL (ref 3.4–5)
ALP BLD-CCNC: 85 U/L (ref 40–129)
ALT SERPL-CCNC: 24 U/L (ref 10–40)
ANION GAP SERPL CALCULATED.3IONS-SCNC: 17 MMOL/L (ref 3–16)
AST SERPL-CCNC: 18 U/L (ref 15–37)
BASOPHILS ABSOLUTE: 0.1 K/UL (ref 0–0.2)
BASOPHILS RELATIVE PERCENT: 0.7 %
BILIRUB SERPL-MCNC: 0.3 MG/DL (ref 0–1)
BUN BLDV-MCNC: 15 MG/DL (ref 7–20)
CALCIUM SERPL-MCNC: 9.4 MG/DL (ref 8.3–10.6)
CHLORIDE BLD-SCNC: 98 MMOL/L (ref 99–110)
CHOLESTEROL, FASTING: 212 MG/DL (ref 0–199)
CO2: 22 MMOL/L (ref 21–32)
CREAT SERPL-MCNC: 0.6 MG/DL (ref 0.6–1.1)
CREATININE URINE: 57.1 MG/DL (ref 28–259)
EOSINOPHILS ABSOLUTE: 0.3 K/UL (ref 0–0.6)
EOSINOPHILS RELATIVE PERCENT: 2.9 %
GFR AFRICAN AMERICAN: >60
GFR NON-AFRICAN AMERICAN: >60
GLUCOSE BLD-MCNC: 189 MG/DL (ref 70–99)
HCT VFR BLD CALC: 48.6 % (ref 36–48)
HDLC SERPL-MCNC: 31 MG/DL (ref 40–60)
HEMOGLOBIN: 15.8 G/DL (ref 12–16)
LDL CHOLESTEROL CALCULATED: 134 MG/DL
LYMPHOCYTES ABSOLUTE: 2.9 K/UL (ref 1–5.1)
LYMPHOCYTES RELATIVE PERCENT: 32.3 %
MCH RBC QN AUTO: 28.2 PG (ref 26–34)
MCHC RBC AUTO-ENTMCNC: 32.6 G/DL (ref 31–36)
MCV RBC AUTO: 86.5 FL (ref 80–100)
MICROALBUMIN UR-MCNC: <1.2 MG/DL
MICROALBUMIN/CREAT UR-RTO: NORMAL MG/G (ref 0–30)
MONOCYTES ABSOLUTE: 0.6 K/UL (ref 0–1.3)
MONOCYTES RELATIVE PERCENT: 7 %
NEUTROPHILS ABSOLUTE: 5.1 K/UL (ref 1.7–7.7)
NEUTROPHILS RELATIVE PERCENT: 57.1 %
PDW BLD-RTO: 13.7 % (ref 12.4–15.4)
PLATELET # BLD: 261 K/UL (ref 135–450)
PMV BLD AUTO: 9.5 FL (ref 5–10.5)
POTASSIUM SERPL-SCNC: 4.3 MMOL/L (ref 3.5–5.1)
RBC # BLD: 5.62 M/UL (ref 4–5.2)
SODIUM BLD-SCNC: 137 MMOL/L (ref 136–145)
TOTAL PROTEIN: 8 G/DL (ref 6.4–8.2)
TRIGLYCERIDE, FASTING: 235 MG/DL (ref 0–150)
TSH REFLEX FT4: 1.21 UIU/ML (ref 0.27–4.2)
VLDLC SERPL CALC-MCNC: 47 MG/DL
WBC # BLD: 8.9 K/UL (ref 4–11)

## 2021-12-10 RX ORDER — DULAGLUTIDE 3 MG/.5ML
INJECTION, SOLUTION SUBCUTANEOUS
Qty: 2 ML | Refills: 1 | OUTPATIENT
Start: 2021-12-10

## 2021-12-14 ENCOUNTER — TELEPHONE (OUTPATIENT)
Dept: INTERNAL MEDICINE CLINIC | Age: 41
End: 2021-12-14

## 2021-12-14 NOTE — TELEPHONE ENCOUNTER
----- Message from Alayna Mccormack sent at 12/14/2021 12:31 PM EST -----  Subject: Message to Provider    QUESTIONS  Information for Provider? pt resched her appt from today to 2/16 and would   like to continue to get bp refills. pt stated she has enough for awhile   but will need a refill before appt. please contact pt with any questions.  ---------------------------------------------------------------------------  --------------  CALL BACK INFO  What is the best way for the office to contact you? OK to leave message on   voicemail  Preferred Call Back Phone Number? [phone]  ---------------------------------------------------------------------------  --------------  SCRIPT ANSWERS  Relationship to Patient?  Self

## 2021-12-29 ENCOUNTER — TELEPHONE (OUTPATIENT)
Dept: INTERNAL MEDICINE CLINIC | Age: 41
End: 2021-12-29

## 2021-12-29 DIAGNOSIS — J45.909 UNCOMPLICATED ASTHMA, UNSPECIFIED ASTHMA SEVERITY, UNSPECIFIED WHETHER PERSISTENT: ICD-10-CM

## 2021-12-29 RX ORDER — ALBUTEROL SULFATE 90 UG/1
AEROSOL, METERED RESPIRATORY (INHALATION)
Qty: 18 G | Refills: 0 | Status: SHIPPED | OUTPATIENT
Start: 2021-12-29 | End: 2022-02-09

## 2021-12-29 NOTE — TELEPHONE ENCOUNTER
[place] has already been sent for 90 days on 72/41  Trulicity has been sent on 11/23 with 1 refill. Gemfibrozil was already sent to pharmacy on 11/23    Will send a script over for albuterol only. Venkata Omer will address the other scripts when she returns. Patient states she has had a lot of complications. She has had death in the family and a lot of her family members have had covid. She rescheduled to for February which is the soonest that she can get in due to no transportation. I advised Latashala Negra will make this call when she comes in on Tuesday.

## 2021-12-29 NOTE — TELEPHONE ENCOUNTER
----- Message from Tyrone Sultana sent at 12/29/2021 12:01 PM EST -----  Subject: Refill Request    QUESTIONS  Name of Medication? Dulaglutide (TRULICITY) 3 YS/9.5UZ SOPN  Patient-reported dosage and instructions? once a week  How many days do you have left? 1  Preferred Pharmacy? [facility] #[id number]  Pharmacy phone number (if available)? [phone]  ---------------------------------------------------------------------------  --------------,  Name of Medication? gemfibrozil (LOPID) 600 MG tablet  Patient-reported dosage and instructions? 2 times a day  How many days do you have left? 9  Preferred Pharmacy? [facility] #[id number]  Pharmacy phone number (if available)? [phone]  ---------------------------------------------------------------------------  --------------,  Name of Medication? dapagliflozin (FARXIGA) 5 MG tablet  Patient-reported dosage and instructions? once daily  How many days do you have left? 0  Preferred Pharmacy? [facility] #[id number]  Pharmacy phone number (if available)? [phone]  ---------------------------------------------------------------------------  --------------,  Name of Medication? albuterol sulfate  (90 Base) MCG/ACT inhaler  Patient-reported dosage and instructions? up to 4 times a day  How many days do you have left? 1  [id number]  Pharmacy phone number (if available)? [phone]  Additional Information for Provider? Would like enough refills to last   until March.  ---------------------------------------------------------------------------  --------------  CALL BACK INFO  What is the best way for the office to contact you? OK to leave message on   voicemail  Preferred Call Back Phone Number?  [phone]

## 2021-12-29 NOTE — TELEPHONE ENCOUNTER
----- Message from Almaz Xiong sent at 12/29/2021 12:07 PM EST -----  Subject: Message to Provider    QUESTIONS  Information for Provider? Patient received mail about cancelling appts. Patient said that she cancelled her appt within the 24 hour period and is   wondering why she received the letter. Patient requested medications   refills for 3 months. Patient had a death in the family and exposure to   covid 23. Patient is requesting a call back to inform her about her   medications. Does not want to change doctors. ---------------------------------------------------------------------------  --------------  Missy CEDEÑO  What is the best way for the office to contact you? OK to leave message on   voicemail  Preferred Call Back Phone Number?  [phone]  ---------------------------------------------------------------------------  --------------  SCRIPT ANSWERS  undefined

## 2022-01-03 NOTE — TELEPHONE ENCOUNTER
Patient advised through voicemail. Patient scheduled for February. It appears albuterol was already refilled. Requested patient to return call to office to let us know what other medications she needed to have refilled.

## 2022-01-04 ENCOUNTER — TELEPHONE (OUTPATIENT)
Dept: INTERNAL MEDICINE CLINIC | Age: 42
End: 2022-01-04

## 2022-01-04 NOTE — TELEPHONE ENCOUNTER
Pt calling requesting refill of [employer]     Last written 11/23/21  Last OV 10/12/21  Next OV 2/16/22  Last recommended OV   Please send to   Melissa Palmer

## 2022-01-05 RX ORDER — DULAGLUTIDE 3 MG/.5ML
3 INJECTION, SOLUTION SUBCUTANEOUS WEEKLY
Qty: 2 ML | Refills: 0 | Status: SHIPPED | OUTPATIENT
Start: 2022-01-05 | End: 2022-02-07

## 2022-02-09 DIAGNOSIS — J45.909 UNCOMPLICATED ASTHMA, UNSPECIFIED ASTHMA SEVERITY, UNSPECIFIED WHETHER PERSISTENT: ICD-10-CM

## 2022-02-09 RX ORDER — ALBUTEROL SULFATE 90 UG/1
AEROSOL, METERED RESPIRATORY (INHALATION)
Qty: 54 G | OUTPATIENT
Start: 2022-02-09

## 2022-02-09 RX ORDER — ALBUTEROL SULFATE 90 UG/1
AEROSOL, METERED RESPIRATORY (INHALATION)
Qty: 18 G | Refills: 0 | Status: SHIPPED | OUTPATIENT
Start: 2022-02-09 | End: 2022-03-15 | Stop reason: SDUPTHER

## 2022-02-16 ENCOUNTER — OFFICE VISIT (OUTPATIENT)
Dept: INTERNAL MEDICINE CLINIC | Age: 42
End: 2022-02-16
Payer: COMMERCIAL

## 2022-02-16 VITALS
SYSTOLIC BLOOD PRESSURE: 130 MMHG | OXYGEN SATURATION: 97 % | HEIGHT: 69 IN | BODY MASS INDEX: 31.81 KG/M2 | HEART RATE: 93 BPM | WEIGHT: 214.8 LBS | TEMPERATURE: 97.3 F | DIASTOLIC BLOOD PRESSURE: 72 MMHG

## 2022-02-16 DIAGNOSIS — E11.9 TYPE 2 DIABETES MELLITUS WITHOUT COMPLICATION, WITHOUT LONG-TERM CURRENT USE OF INSULIN (HCC): ICD-10-CM

## 2022-02-16 DIAGNOSIS — R80.9 MICROALBUMINURIA: Primary | ICD-10-CM

## 2022-02-16 DIAGNOSIS — F25.0 SCHIZOAFFECTIVE DISORDER, BIPOLAR TYPE (HCC): ICD-10-CM

## 2022-02-16 DIAGNOSIS — Z71.85 VACCINE COUNSELING: ICD-10-CM

## 2022-02-16 DIAGNOSIS — J45.909 UNCOMPLICATED ASTHMA, UNSPECIFIED ASTHMA SEVERITY, UNSPECIFIED WHETHER PERSISTENT: ICD-10-CM

## 2022-02-16 DIAGNOSIS — F17.200 SMOKER: ICD-10-CM

## 2022-02-16 DIAGNOSIS — E78.2 MIXED HYPERLIPIDEMIA: ICD-10-CM

## 2022-02-16 LAB — HBA1C MFR BLD: 7.9 %

## 2022-02-16 PROCEDURE — 99214 OFFICE O/P EST MOD 30 MIN: CPT | Performed by: NURSE PRACTITIONER

## 2022-02-16 PROCEDURE — 3051F HG A1C>EQUAL 7.0%<8.0%: CPT | Performed by: NURSE PRACTITIONER

## 2022-02-16 PROCEDURE — 83036 HEMOGLOBIN GLYCOSYLATED A1C: CPT | Performed by: NURSE PRACTITIONER

## 2022-02-16 RX ORDER — GEMFIBROZIL 600 MG/1
TABLET, FILM COATED ORAL
Qty: 180 TABLET | Refills: 0 | Status: SHIPPED | OUTPATIENT
Start: 2022-02-16

## 2022-02-16 RX ORDER — DULAGLUTIDE 3 MG/.5ML
INJECTION, SOLUTION SUBCUTANEOUS
Qty: 2 ML | Refills: 0 | Status: SHIPPED | OUTPATIENT
Start: 2022-02-16 | End: 2022-03-08 | Stop reason: SDUPTHER

## 2022-02-16 ASSESSMENT — ENCOUNTER SYMPTOMS
SHORTNESS OF BREATH: 0
COUGH: 0
DIARRHEA: 0
ABDOMINAL PAIN: 0
NAUSEA: 0
WHEEZING: 0
CONSTIPATION: 0
VOMITING: 0

## 2022-02-16 NOTE — PROGRESS NOTES
Office Visit  2/16/2022    Subjective:  Chief Complaint   Patient presents with    Follow-up     diabetes, needs a med refill     HPI:  Stephani Liz is a 39 y.o. female who presents to the clinic today for follow up.     Microalbuminuria-noted on 9/30/2020.  Lisinopril and losartan were both trialed separately. Side effects were reported. She stopped and her symptoms completely resolved off of this medication. She is not interested in another ACE or ARB. DM- Prescribed Trulicity 3 mg SQ weekly and farxiga 5 mg daily.   Side effects on glipizide. Metformin caused \"explosive diarrhea. \" Jardiance had side effects. Not monitoring sugars at home. Denies episodes of hypoglycemia. Diet is reported as \"I am not perfect. \"  Not exercising. Asthma- childhood asthma per pt.  Uses albuterol as needed with relief- using 1x/month. Denies trouble breathing/SOB/wheezing.      Hyperlipidemiathe patient is taking gemfibrozil 600 mg by mouth twice daily without side effects.  She would like to continue this medication at this time. Mood- Continues to follow with Kate Pacheco. Following with psychiatry and psychology. Stable at this time per pt. Denies SI/HI. Smoking - 2 ppd. Not ready to cut down.     Review of Systems   Constitutional: Negative for chills, fatigue, fever and unexpected weight change. Eyes: Negative for visual disturbance. Respiratory: Negative for cough, shortness of breath and wheezing. Cardiovascular: Negative for chest pain, palpitations and leg swelling. Gastrointestinal: Negative for abdominal pain, constipation, diarrhea, nausea and vomiting. Skin: Negative for pallor and rash. Neurological: Negative for dizziness, weakness, light-headedness, numbness and headaches. Psychiatric/Behavioral: Negative for dysphoric mood, self-injury, sleep disturbance and suicidal ideas. The patient is not nervous/anxious.       Allergies   Allergen Reactions    Latuda [Lurasidone Hcl] Anaphylaxis    Lisinopril Swelling and Palpitations     Current Outpatient Rx   Medication Sig Dispense Refill    gemfibrozil (LOPID) 600 MG tablet TAKE 1 TABLET BY MOUTH TWICE DAILY 180 tablet 0    dapagliflozin (FARXIGA) 10 MG tablet TAKE 1 TABLET BY MOUTH EVERY MORNING 90 tablet 0    Dulaglutide (TRULICITY) 3 SK/1.1EA SOPN INJECT 3MG(1 PEN) SUBCUTANEOUS ONCE WEEKLY 2 mL 0    albuterol sulfate  (90 Base) MCG/ACT inhaler INHALE 1 TO 2 PUFFS BY MOUTH EVERY 6 HOURS AS NEEDED FOR WHEEZING 18 g 0    traZODone (DESYREL) 100 MG tablet Take 100 mg by mouth nightly 1-2 tabs nightly      paliperidone Palmitate (INVEGA TRINZA) 273 MG/0.875ML SUSP IM injection Inject 273 mg into the muscle once      clonazePAM (KLONOPIN) 0.5 MG tablet Take 0.5 mg by mouth 3 times daily as needed. [place] Mom ziprasidone (GEODON) 60 MG capsule Take 60 mg by mouth every morning      FLUoxetine (PROZAC) 20 MG capsule Take 20 mg by mouth daily      hydrOXYzine (VISTARIL) 50 MG capsule Take 50 mg by mouth 4 times daily as needed for Itching      ziprasidone (GEODON) 80 MG capsule Take 80 mg by mouth 2 times daily (with meals)         Patient Active Problem List   Diagnosis    Overdose of antidepressant    [facility]ia ([facility]    Diabetes mellitus ([facility]    Asthma    Hyperlipidemia        Wt Readings from Last 3 Encounters:   02/16/22 214 lb 12.8 oz (97.4 kg)   08/12/21 229 lb 9.6 oz (104.1 kg)   04/20/21 228 lb (103.4 kg)     BP Readings from Last 3 Encounters:   02/16/22 130/72   08/12/21 122/60   04/20/21 126/80     The 10-year ASCVD risk score (Norah Galvan, et al., 2013) is: 15%    Values used to calculate the score:      Age: 39 years      Sex: Female      Is Non- : No      Diabetic: Yes      Tobacco smoker: Yes      Systolic Blood Pressure: 580 mmHg      Is BP treated: No      HDL Cholesterol: 31 mg/dL      Total Cholesterol: 212 mg/dL    Objective/Physical Exam:  /72 (Site: Right Upper Arm, persistent   - Well-controlled with rare, as needed albuterol use. Mixed hyperlipidemia  -     Reviewed lipid panel with the patient. Reviewed ASCVD risk score. Reviewed statins and risks versus benefits.  - Patient reports that she is taking her gemfibrozil and she would like to continue gemfibrozil on the current dose and increase lifestyle modifications. She states she has side effects to multiple medications and she would like to control hyperlipidemia with lifestyle modifications. - Lifestyle modifications such as exercise, weight loss and healthy diet encouraged and reviewed with the pt. - gemfibrozil (LOPID) 600 MG tablet; TAKE 1 TABLET BY MOUTH TWICE DAILY-refilled today    Schizoaffective disorder, bipolar type ([facility] [address] [address]   - Denies SI/HI. Continue with psychiatry    Smoker   - Cessation recommended     Vaccine counseling   - CDC guidelines regarding Influenza vaccine reviewed with the patient. Patient states she will go to the pharmacy to have this completed. Return in about 3 months (around 5/16/2022) for DM/diabetic foot exam f/u, or sooner if needed. Pt will call if symptoms worsen or fail to improve. All questions answered. Pt states no further questions or concerns at this time.    Electronically signed by: ARTI Kamara CNP 02/16/22

## 2022-02-20 DIAGNOSIS — E11.9 TYPE 2 DIABETES MELLITUS WITHOUT COMPLICATION, WITHOUT LONG-TERM CURRENT USE OF INSULIN (HCC): ICD-10-CM

## 2022-02-21 NOTE — TELEPHONE ENCOUNTER
A 90 day supply was sent last week.  Need to see if pt tolerates this medication and monitor sugars before refill

## 2022-03-07 ENCOUNTER — TELEPHONE (OUTPATIENT)
Dept: INTERNAL MEDICINE CLINIC | Age: 42
End: 2022-03-07

## 2022-03-07 DIAGNOSIS — E11.9 TYPE 2 DIABETES MELLITUS WITHOUT COMPLICATION, WITHOUT LONG-TERM CURRENT USE OF INSULIN (HCC): ICD-10-CM

## 2022-03-07 NOTE — TELEPHONE ENCOUNTER
Pt was calling ---she was here in Feb and thought you didn't give her any refills---you ordered her 3 mo worth at one time---she could not understand why you gave her no refills---told her she gave you enough to get to the next appt--Thanks.

## 2022-03-08 DIAGNOSIS — E11.9 TYPE 2 DIABETES MELLITUS WITHOUT COMPLICATION, WITHOUT LONG-TERM CURRENT USE OF INSULIN (HCC): ICD-10-CM

## 2022-03-08 RX ORDER — DULAGLUTIDE 3 MG/.5ML
INJECTION, SOLUTION SUBCUTANEOUS
Qty: 2 ML | Refills: 0 | Status: SHIPPED | OUTPATIENT
Start: 2022-03-08 | End: 2022-03-24 | Stop reason: SDUPTHER

## 2022-03-08 NOTE — TELEPHONE ENCOUNTER
----- Message from [employer] sent at 3/7/2022  4:57 PM EST -----  Subject: Refill Request    QUESTIONS  Name of Medication? Dulaglutide (TRULICITY) 3 UB/2.6DR SOPN  Patient-reported dosage and instructions? INJECT 3MG(1 PEN) SUBCUTANEOUS   ONCE WEEKLY  How many days do you have left? 24  [id number]  Pharmacy phone number (if available)? [phone]  Additional Information for Provider? Requesting 90-day supply to hold her   over to May; requesting call back from PCP Elizabeth Garcia  ---------------------------------------------------------------------------  --------------,  Name of Medication? albuterol sulfate  (90 Base) MCG/ACT inhaler  Patient-reported dosage and instructions? NHALE 1 TO 2 PUFFS BY MOUTH   EVERY 6 HOURS AS NEEDED FOR WHEEZING  How many days do you have left? 30  Preferred Pharmacy? [facility] #[id number]  Pharmacy phone number (if available)? [phone]  Additional Information for Provider? Looking to have enough supply to her   until May; requesting call back from PCP Elizabeth Garcia  ---------------------------------------------------------------------------  --------------  [address]  What is the best way for the office to contact you? OK to leave message on   voicemail  Preferred Call Back Phone Number?  [phone]

## 2022-03-14 ENCOUNTER — TELEPHONE (OUTPATIENT)
Dept: INTERNAL MEDICINE CLINIC | Age: 42
End: 2022-03-14

## 2022-03-14 DIAGNOSIS — J45.909 UNCOMPLICATED ASTHMA, UNSPECIFIED ASTHMA SEVERITY, UNSPECIFIED WHETHER PERSISTENT: ICD-10-CM

## 2022-03-14 DIAGNOSIS — E11.9 TYPE 2 DIABETES MELLITUS WITHOUT COMPLICATION, WITHOUT LONG-TERM CURRENT USE OF INSULIN (HCC): ICD-10-CM

## 2022-03-14 NOTE — TELEPHONE ENCOUNTER
----- Message from [facility] sent at 3/14/2022  1:28 PM EDT -----  Subject: Medication Problem    QUESTIONS  Name of Medication? albuterol sulfate  (90 Base) MCG/ACT inhaler  Patient-reported dosage and instructions? twice a day as needed every 6   hours  What question or problem do you have with the medication? Patient would   like her PCP to send a refill over to her pharmacy. Please contact patient   to further assist.   Preferred Pharmacy? [facility] [address]  [address] [phone]  Pharmacy phone number (if available)? [phone]  Additional Information for Provider?   ---------------------------------------------------------------------------  --------------  CALL BACK INFO  What is the best way for the office to contact you? OK to leave message on   voicemail  Preferred Call Back Phone Number? [phone]  ---------------------------------------------------------------------------  --------------  SCRIPT ANSWERS  Relationship to Patient?  Self

## 2022-03-15 ENCOUNTER — TELEPHONE (OUTPATIENT)
Dept: INTERNAL MEDICINE CLINIC | Age: 42
End: 2022-03-15

## 2022-03-15 DIAGNOSIS — E11.9 TYPE 2 DIABETES MELLITUS WITHOUT COMPLICATION, WITHOUT LONG-TERM CURRENT USE OF INSULIN (HCC): ICD-10-CM

## 2022-03-15 RX ORDER — DULAGLUTIDE 3 MG/.5ML
3 INJECTION, SOLUTION SUBCUTANEOUS WEEKLY
Qty: 4 PEN | Refills: 2 | OUTPATIENT
Start: 2022-03-15 | End: 2022-06-13

## 2022-03-15 RX ORDER — DULAGLUTIDE 3 MG/.5ML
INJECTION, SOLUTION SUBCUTANEOUS
Qty: 2 ML | Refills: 0 | OUTPATIENT
Start: 2022-03-15

## 2022-03-15 RX ORDER — ALBUTEROL SULFATE 90 UG/1
AEROSOL, METERED RESPIRATORY (INHALATION)
Qty: 18 G | Refills: 0 | Status: SHIPPED | OUTPATIENT
Start: 2022-03-15

## 2022-03-15 NOTE — TELEPHONE ENCOUNTER
Medications were sent. Pharmacy should contact us when refills are needed. Will refill until her next visit.

## 2022-03-15 NOTE — TELEPHONE ENCOUNTER
Patient requested prescriptions with refills for Dulaglutide (TRULICITY) 3 WC/3.9FB SOPN & albuterol sulfate  (90 Base) MCG/ACT inhaler. She states prescriptions were sent with no refills and she asked why there are not refills?

## 2022-03-24 ENCOUNTER — TELEPHONE (OUTPATIENT)
Dept: INTERNAL MEDICINE CLINIC | Age: 42
End: 2022-03-24

## 2022-03-24 DIAGNOSIS — E11.9 TYPE 2 DIABETES MELLITUS WITHOUT COMPLICATION, WITHOUT LONG-TERM CURRENT USE OF INSULIN (HCC): ICD-10-CM

## 2022-03-24 PROCEDURE — 3051F HG A1C>EQUAL 7.0%<8.0%: CPT | Performed by: NURSE PRACTITIONER

## 2022-03-24 RX ORDER — DULAGLUTIDE 3 MG/.5ML
INJECTION, SOLUTION SUBCUTANEOUS
Qty: 2 ML | Refills: 1 | Status: SHIPPED | OUTPATIENT
Start: 2022-03-24

## 2022-03-24 NOTE — TELEPHONE ENCOUNTER
----- Message from Parris Dakin sent at 3/24/2022  1:18 PM EDT -----  Subject: Refill Request    QUESTIONS  Name of Medication? Dulaglutide (TRULICITY) 3 JB/1.2AE SOPN  Patient-reported dosage and instructions? INJECT 3MG(1 PEN) SUBCUTANEOUS   ONCE WEEKLY  How many days do you have left? 1  Preferred Pharmacy? [facility] #[id number]  Pharmacy phone number (if available)? [phone]  Additional Information for Provider? Patient is calling because she needs   enough to cover her until her upcoming appointment. Please advise  ---------------------------------------------------------------------------  --------------  CALL BACK INFO  What is the best way for the office to contact you? OK to leave message on   voicemail  Preferred Call Back Phone Number?  [phone]

## 2022-03-24 NOTE — TELEPHONE ENCOUNTER
Patient states she did get the refill on 3/8 but only has one pen/week left. Next f/u OV 5/10/22. Patient is asking for enough to at least get her through that time until her next OV.

## 2022-03-24 NOTE — TELEPHONE ENCOUNTER
----- Message from Nickijhoanaaminata Khanlita sent at 3/24/2022  1:18 PM EDT -----  Subject: Refill Request    QUESTIONS  Name of Medication? Dulaglutide (TRULICITY) 3 MX/7.0QP SOPN  Patient-reported dosage and instructions? INJECT 3MG(1 PEN) SUBCUTANEOUS   ONCE WEEKLY  How many days do you have left? 1  Preferred [facility]acy? [id number]  Pharmacy phone number (if available)? [phone]  Additional Information for Provider? Patient is calling because she needs   enough to cover her until her upcoming appointment. Please advise  ---------------------------------------------------------------------------  --------------  CALL BACK INFO  What is the best way for the office to contact you? OK to leave message on   voicemail  Preferred Call Back Phone Number?  [phone]

## 2022-05-02 LAB
AVERAGE GLUCOSE: NORMAL
HBA1C MFR BLD: 9.2 %

## 2022-07-18 LAB
AVERAGE GLUCOSE: NORMAL
CHOLESTEROL, TOTAL: 221 MG/DL
CHOLESTEROL/HDL RATIO: ABNORMAL
CREATININE, URINE: NORMAL
HBA1C MFR BLD: 8.8 %
HDLC SERPL-MCNC: 34 MG/DL (ref 35–70)
LDL CHOLESTEROL CALCULATED: 164 MG/DL (ref 0–160)
MICROALBUMIN/CREAT 24H UR: 25.6 MG/G{CREAT}
MICROALBUMIN/CREAT UR-RTO: 14
NONHDLC SERPL-MCNC: ABNORMAL MG/DL
TRIGL SERPL-MCNC: 125 MG/DL
VLDLC SERPL CALC-MCNC: 23 MG/DL

## 2022-09-22 LAB — DIABETIC RETINOPATHY: NEGATIVE

## 2023-03-09 LAB
AVERAGE GLUCOSE: NORMAL
HBA1C MFR BLD: 8.4 %

## 2023-05-22 LAB
CHOLESTEROL, TOTAL: 130 MG/DL
CHOLESTEROL/HDL RATIO: ABNORMAL
CREATININE, URINE: NORMAL
HDLC SERPL-MCNC: 33 MG/DL (ref 35–70)
LDL CHOLESTEROL CALCULATED: 70 MG/DL (ref 0–160)
MICROALBUMIN/CREAT 24H UR: <3 MG/G{CREAT}
MICROALBUMIN/CREAT UR-RTO: <4
NONHDLC SERPL-MCNC: ABNORMAL MG/DL
TRIGL SERPL-MCNC: 156 MG/DL
VLDLC SERPL CALC-MCNC: 27 MG/DL

## 2023-06-19 ENCOUNTER — COMMUNITY OUTREACH (OUTPATIENT)
Dept: INTERNAL MEDICINE CLINIC | Age: 43
End: 2023-06-19

## 2023-06-19 NOTE — PROGRESS NOTES
Care Everywhere audit shows patient had labs completed such as MicroAlbumin, A1C and, Lipids. Health maintenance has been updated.

## 2023-07-17 ENCOUNTER — TELEPHONE (OUTPATIENT)
Dept: INTERNAL MEDICINE CLINIC | Age: 43
End: 2023-07-17

## 2023-07-17 NOTE — TELEPHONE ENCOUNTER
LVM for pt to clarify appt for 7/21. She is a pt of Oralia Shields and scheduled a physical with Otilia Mosqueda, pt has been seeing another PCP with [facility] Everywhere. If pt returns call please clarify if she is seeing another PCP, she is unable to have multiple PCPs.

## 2023-07-19 NOTE — TELEPHONE ENCOUNTER
As a rule we do not typically change providers in the office, so I would continue to follow this. Yes, it appears she has a new PCP at [facility]. She cannot have multiple PCPs.  Alejandra Menard

## 2023-07-21 ENCOUNTER — OFFICE VISIT (OUTPATIENT)
Dept: PRIMARY CARE CLINIC | Age: 43
End: 2023-07-21
Payer: COMMERCIAL

## 2023-07-21 VITALS
HEART RATE: 101 BPM | HEIGHT: 69 IN | OXYGEN SATURATION: 98 % | DIASTOLIC BLOOD PRESSURE: 62 MMHG | SYSTOLIC BLOOD PRESSURE: 108 MMHG | BODY MASS INDEX: 30.18 KG/M2 | WEIGHT: 203.8 LBS

## 2023-07-21 DIAGNOSIS — E11.9 DIABETES MELLITUS TYPE II, NON INSULIN DEPENDENT (HCC): Primary | ICD-10-CM

## 2023-07-21 DIAGNOSIS — J45.20 MILD INTERMITTENT ASTHMA WITHOUT COMPLICATION: ICD-10-CM

## 2023-07-21 DIAGNOSIS — E78.2 MIXED HYPERLIPIDEMIA: ICD-10-CM

## 2023-07-21 PROCEDURE — 3052F HG A1C>EQUAL 8.0%<EQUAL 9.0%: CPT | Performed by: FAMILY MEDICINE

## 2023-07-21 PROCEDURE — 99203 OFFICE O/P NEW LOW 30 MIN: CPT | Performed by: FAMILY MEDICINE

## 2023-07-21 RX ORDER — ROSUVASTATIN CALCIUM 10 MG/1
10 TABLET, COATED ORAL DAILY
COMMUNITY

## 2023-07-21 RX ORDER — ALBUTEROL SULFATE 90 UG/1
AEROSOL, METERED RESPIRATORY (INHALATION)
Qty: 18 G | Refills: 5 | Status: SHIPPED | OUTPATIENT
Start: 2023-07-21

## 2023-07-21 SDOH — ECONOMIC STABILITY: FOOD INSECURITY: WITHIN THE PAST 12 MONTHS, YOU WORRIED THAT YOUR FOOD WOULD RUN OUT BEFORE YOU GOT MONEY TO BUY MORE.: NEVER TRUE

## 2023-07-21 SDOH — ECONOMIC STABILITY: FOOD INSECURITY: WITHIN THE PAST 12 MONTHS, THE FOOD YOU BOUGHT JUST DIDN'T LAST AND YOU DIDN'T HAVE MONEY TO GET MORE.: NEVER TRUE

## 2023-07-21 SDOH — ECONOMIC STABILITY: INCOME INSECURITY: HOW HARD IS IT FOR YOU TO PAY FOR THE VERY BASICS LIKE FOOD, HOUSING, MEDICAL CARE, AND HEATING?: NOT HARD AT ALL

## 2023-07-21 SDOH — ECONOMIC STABILITY: HOUSING INSECURITY
IN THE LAST 12 MONTHS, WAS THERE A TIME WHEN YOU DID NOT HAVE A STEADY PLACE TO SLEEP OR SLEPT IN A SHELTER (INCLUDING NOW)?: NO

## 2023-07-21 ASSESSMENT — PATIENT HEALTH QUESTIONNAIRE - PHQ9
8. MOVING OR SPEAKING SO SLOWLY THAT OTHER PEOPLE COULD HAVE NOTICED. OR THE OPPOSITE, BEING SO FIGETY OR RESTLESS THAT YOU HAVE BEEN MOVING AROUND A LOT MORE THAN USUAL: 0
10. IF YOU CHECKED OFF ANY PROBLEMS, HOW DIFFICULT HAVE THESE PROBLEMS MADE IT FOR YOU TO DO YOUR WORK, TAKE CARE OF THINGS AT HOME, OR GET ALONG WITH OTHER PEOPLE: 0
7. TROUBLE CONCENTRATING ON THINGS, SUCH AS READING THE NEWSPAPER OR WATCHING TELEVISION: 1
5. POOR APPETITE OR OVEREATING: 0
1. LITTLE INTEREST OR PLEASURE IN DOING THINGS: 1
9. THOUGHTS THAT YOU WOULD BE BETTER OFF DEAD, OR OF HURTING YOURSELF: 0
SUM OF ALL RESPONSES TO PHQ QUESTIONS 1-9: 5
6. FEELING BAD ABOUT YOURSELF - OR THAT YOU ARE A FAILURE OR HAVE LET YOURSELF OR YOUR FAMILY DOWN: 1
3. TROUBLE FALLING OR STAYING ASLEEP: 0
SUM OF ALL RESPONSES TO PHQ QUESTIONS 1-9: 5
SUM OF ALL RESPONSES TO PHQ9 QUESTIONS 1 & 2: 2
SUM OF ALL RESPONSES TO PHQ QUESTIONS 1-9: 5
4. FEELING TIRED OR HAVING LITTLE ENERGY: 1
2. FEELING DOWN, DEPRESSED OR HOPELESS: 1
SUM OF ALL RESPONSES TO PHQ QUESTIONS 1-9: 5

## 2023-07-21 NOTE — PROGRESS NOTES
PROGRESS NOTE  Date of Service:  2023    Chief Complaint   Patient presents with    New Patient     Pt wants to maintain DM and Cholesterol level    Medication Refill     Pt wants [employer] brand Albuterol inhale        SUBJECTIVE: Luh Dougherty is a 43 y.o. female, former patient of Dr. Fransico Rodriguez, here as a new patient to establish care. She is being treated for DMT2, Hyperlipidemia, asthma and schizoaffective disorder. Patient needs management of her asthma, diabetes and hyperlipidemia. Requesting refill of albuterol which specific  that works better for her. She needs better control of her diabetes. Compliant with current medications but not with diet. She smokes a pack a day and no plans to quit. 2023: Seen Dr. Haim Waters, rheumatologist,  at [facility], for her hidradenitis in the genital area which she had for several years. Antibiotic off-and-on with limited success. Similar lesion in the underarm. She was given a course of antibiotics, Bactrim. Plans to start Humira on her next appt on     Goes to her Psychiatrist for her Schizophrenia management, Areli Hood    Reviewed blood test done 2023: Total cholesterol 130, HDL 33, LDL 70, triglyceride 156, urine microalbumin less than 4. Last hemoglobin A1c on 3/9/2023 was 8.4%. , goes to her Zulay Emery, last Pap smear     Patient diagnosed with Schizoaffective disorder/PTSD since 12 y/o, been disabled since.  On Medicare since       Past Medical History:   Diagnosis Date    Asthma     Diabetes mellitus ([facility]) 2013    Erb's palsy     left arm    Hyperlipidemia     PTSD (post-traumatic stress disorder)     Schizoaffective disorder ([facility])     diag 13years old      Past Surgical History:   Procedure Laterality Date    TONSILLECTOMY AND ADENOIDECTOMY  1992    ADENOIDS      Social History     Tobacco Use    Smoking status: Every Day     Packs/day: 1.00     Years: 23.00     Pack

## 2023-09-21 ENCOUNTER — OFFICE VISIT (OUTPATIENT)
Dept: PRIMARY CARE CLINIC | Age: 43
End: 2023-09-21
Payer: COMMERCIAL

## 2023-09-21 VITALS
SYSTOLIC BLOOD PRESSURE: 122 MMHG | WEIGHT: 207 LBS | OXYGEN SATURATION: 97 % | BODY MASS INDEX: 30.66 KG/M2 | HEART RATE: 88 BPM | HEIGHT: 69 IN | DIASTOLIC BLOOD PRESSURE: 72 MMHG

## 2023-09-21 DIAGNOSIS — Z23 NEED FOR INFLUENZA VACCINATION: ICD-10-CM

## 2023-09-21 DIAGNOSIS — E11.9 DIABETES MELLITUS TYPE II, NON INSULIN DEPENDENT (HCC): ICD-10-CM

## 2023-09-21 DIAGNOSIS — Z00.00 MEDICARE ANNUAL WELLNESS VISIT, SUBSEQUENT: Primary | ICD-10-CM

## 2023-09-21 DIAGNOSIS — J45.20 MILD INTERMITTENT ASTHMA WITHOUT COMPLICATION: ICD-10-CM

## 2023-09-21 DIAGNOSIS — E78.2 MIXED HYPERLIPIDEMIA: ICD-10-CM

## 2023-09-21 LAB — HBA1C MFR BLD: 7.2 %

## 2023-09-21 PROCEDURE — 3051F HG A1C>EQUAL 7.0%<8.0%: CPT | Performed by: FAMILY MEDICINE

## 2023-09-21 PROCEDURE — 83036 HEMOGLOBIN GLYCOSYLATED A1C: CPT | Performed by: FAMILY MEDICINE

## 2023-09-21 PROCEDURE — 90674 CCIIV4 VAC NO PRSV 0.5 ML IM: CPT | Performed by: FAMILY MEDICINE

## 2023-09-21 PROCEDURE — G0439 PPPS, SUBSEQ VISIT: HCPCS | Performed by: FAMILY MEDICINE

## 2023-09-21 PROCEDURE — G0008 ADMIN INFLUENZA VIRUS VAC: HCPCS | Performed by: FAMILY MEDICINE

## 2023-09-21 RX ORDER — ROSUVASTATIN CALCIUM 10 MG/1
10 TABLET, COATED ORAL DAILY
Qty: 90 TABLET | Refills: 3 | Status: SHIPPED | OUTPATIENT
Start: 2023-09-21

## 2023-09-21 RX ORDER — DULAGLUTIDE 3 MG/.5ML
INJECTION, SOLUTION SUBCUTANEOUS
Qty: 6 ML | Refills: 3 | Status: SHIPPED | OUTPATIENT
Start: 2023-09-21

## 2023-09-21 ASSESSMENT — PATIENT HEALTH QUESTIONNAIRE - PHQ9
2. FEELING DOWN, DEPRESSED OR HOPELESS: 0
9. THOUGHTS THAT YOU WOULD BE BETTER OFF DEAD, OR OF HURTING YOURSELF: 0
1. LITTLE INTEREST OR PLEASURE IN DOING THINGS: 0
SUM OF ALL RESPONSES TO PHQ QUESTIONS 1-9: 3
SUM OF ALL RESPONSES TO PHQ QUESTIONS 1-9: 3
3. TROUBLE FALLING OR STAYING ASLEEP: 1
8. MOVING OR SPEAKING SO SLOWLY THAT OTHER PEOPLE COULD HAVE NOTICED. OR THE OPPOSITE, BEING SO FIGETY OR RESTLESS THAT YOU HAVE BEEN MOVING AROUND A LOT MORE THAN USUAL: 0
4. FEELING TIRED OR HAVING LITTLE ENERGY: 1
10. IF YOU CHECKED OFF ANY PROBLEMS, HOW DIFFICULT HAVE THESE PROBLEMS MADE IT FOR YOU TO DO YOUR WORK, TAKE CARE OF THINGS AT HOME, OR GET ALONG WITH OTHER PEOPLE: 1
SUM OF ALL RESPONSES TO PHQ9 QUESTIONS 1 & 2: 0
SUM OF ALL RESPONSES TO PHQ QUESTIONS 1-9: 3
SUM OF ALL RESPONSES TO PHQ QUESTIONS 1-9: 3
7. TROUBLE CONCENTRATING ON THINGS, SUCH AS READING THE NEWSPAPER OR WATCHING TELEVISION: 0
5. POOR APPETITE OR OVEREATING: 1
6. FEELING BAD ABOUT YOURSELF - OR THAT YOU ARE A FAILURE OR HAVE LET YOURSELF OR YOUR FAMILY DOWN: 0

## 2023-09-21 ASSESSMENT — LIFESTYLE VARIABLES
HOW OFTEN DO YOU HAVE A DRINK CONTAINING ALCOHOL: NEVER
HOW MANY STANDARD DRINKS CONTAINING ALCOHOL DO YOU HAVE ON A TYPICAL DAY: PATIENT DOES NOT DRINK

## 2023-09-21 NOTE — PATIENT INSTRUCTIONS
paid in full while other may be subject to a deductible, co-insurance, and/or copay. Some of these benefits include a comprehensive review of your medical history including lifestyle, illnesses that may run in your family, and various assessments and screenings as appropriate. After reviewing your medical record and screening and assessments performed today your provider may have ordered immunizations, labs, imaging, and/or referrals for you. A list of these orders (if applicable) as well as your Preventive Care list are included within your After Visit Summary for your review. Other Preventive Recommendations:    A preventive eye exam performed by an eye specialist is recommended every 1-2 years to screen for glaucoma; cataracts, macular degeneration, and other eye disorders. A preventive dental visit is recommended every 6 months. Try to get at least 150 minutes of exercise per week or 10,000 steps per day on a pedometer . Order or download the FREE \"Exercise & Physical Activity: Your Everyday Guide\" from [employer]. Call 9-323.405.4397 or search [employer] online. You need 6620-7015 mg of calcium and 7367-5057 IU of vitamin D per day. It is possible to meet your calcium requirement with diet alone, but a vitamin D supplement is usually necessary to meet this goal.  When exposed to the sun, use a sunscreen that protects against both UVA and UVB radiation with an SPF of 30 or greater. Reapply every 2 to 3 hours or after sweating, drying off with a towel, or swimming. Always wear a seat belt when traveling in a car. Always wear a helmet when riding a bicycle or motorcycle.

## 2023-09-21 NOTE — PROGRESS NOTES
Metabolic Panel; Future  - TSH with Reflex; Future  - POCT glycosylated hemoglobin (Hb A1C): Hemoglobin A1c is 7.2%  - dapagliflozin (FARXIGA) 10 MG tablet; TAKE 1 TABLET BY MOUTH EVERY MORNING  Dispense: 90 tablet; Refill: 3  - Dulaglutide (TRULICITY) 3 PJ/8.4BU SOPN; INJECT 3MG(1 PEN) SUBCUTANEOUS ONCE WEEKLY  Dispense: 6 mL; Refill: 3    5. Mixed hyperlipidemia  On Crestor 10 mg at night, last LDL was 70. Goal is under 80.  - Comprehensive Metabolic Panel; Future  - Lipid Panel; Future  - rosuvastatin (CRESTOR) 10 MG tablet; Take 1 tablet by mouth daily  Dispense: 90 tablet; Refill: 3    6. Chronic hidradenitis. Continue to follow-up with her rheumatologist. Scheduled for surgery on 10/04 instead of Humira     7. Schizoaffective disorder/PTSD. Continue to follow-up with psychiatrist.  Currently on clonazepam 0.5 mg 3 times a day, Prozac 20 mg daily, Vistaril 50 mg 4 times a day, Invega Trinza 2073 mg IM, trazodone 100 to 200 mg at night, Geodon 60 mg in the morning and Geodon 80 mg at noon and nighttime. Return in about 3 months (around 12/21/2023) for DM f/u. Recommended screening schedule for the next 5-10 years is provided to the patient in written form: see Patient Instructions/AVS.    Electronically signed by Ced Boston MD on 9/21/2023 at 2:40 PM.    This dictation was generated by voice recognition computer software. Although all attempts are made to edit the dictation for accuracy, there may be errors in the transcription that are not intended.

## 2024-02-27 DIAGNOSIS — E11.9 DIABETES MELLITUS TYPE II, NON INSULIN DEPENDENT (HCC): ICD-10-CM

## 2024-02-27 DIAGNOSIS — E78.2 MIXED HYPERLIPIDEMIA: ICD-10-CM

## 2024-02-27 DIAGNOSIS — J45.20 MILD INTERMITTENT ASTHMA WITHOUT COMPLICATION: ICD-10-CM

## 2024-02-27 RX ORDER — ALBUTEROL SULFATE 90 UG/1
AEROSOL, METERED RESPIRATORY (INHALATION)
Qty: 18 G | Refills: 0 | Status: SHIPPED | OUTPATIENT
Start: 2024-02-27

## 2024-02-27 RX ORDER — ROSUVASTATIN CALCIUM 10 MG/1
10 TABLET, COATED ORAL DAILY
Qty: 90 TABLET | Refills: 0 | Status: SHIPPED | OUTPATIENT
Start: 2024-02-27

## 2024-02-27 RX ORDER — DAPAGLIFLOZIN 10 MG/1
TABLET, FILM COATED ORAL
Qty: 90 TABLET | Refills: 0 | Status: SHIPPED | OUTPATIENT
Start: 2024-02-27

## 2024-02-27 RX ORDER — DULAGLUTIDE 3 MG/.5ML
INJECTION, SOLUTION SUBCUTANEOUS
Qty: 6 ML | Refills: 0 | Status: SHIPPED | OUTPATIENT
Start: 2024-02-27

## 2024-02-27 NOTE — TELEPHONE ENCOUNTER
----- Message from Purnima Hoffmann sent at 2/26/2024  1:01 PM EST -----  Subject: Refill Request    QUESTIONS  Name of Medication? albuterol sulfate HFA (PROVENTIL;VENTOLIN;PROAIR) 108   (90 Base) MCG/ACT inhaler  Patient-reported dosage and instructions? 108 mcg as needed  How many days do you have left? 0  Preferred Pharmacy? [facility] #[id number]  Pharmacy phone number (if available)? [phone]  ---------------------------------------------------------------------------  --------------  CALL BACK INFO  What is the best way for the office to contact you? OK to leave message on   voicemail  Preferred Call Back Phone Number? [phone]  ---------------------------------------------------------------------------  --------------  SCRIPT ANSWERS  Relationship to Patient? Self

## 2024-02-27 NOTE — TELEPHONE ENCOUNTER
Recent Visits  Date Type Provider Dept   09/21/23 Office Visit Reyes, Eleia J, MD [facility]   07/21/23 Office Visit Reyes, Eleia J, MD [facility]   Showing recent visits within past 540 days with a meds authorizing provider and meeting all other requirements  Future Appointments  Date Type Provider Dept   03/07/24 Appointment Reyes, Eleia J, MD [facility]   Showing future appointments within next 150 days with a meds authorizing provider and meeting all other requirements

## 2024-03-01 ENCOUNTER — HOSPITAL ENCOUNTER (OUTPATIENT)
Age: 44
Discharge: HOME | End: 2024-03-01
Payer: COMMERCIAL

## 2024-03-01 DIAGNOSIS — E78.2 MIXED HYPERLIPIDEMIA: ICD-10-CM

## 2024-03-01 DIAGNOSIS — Z00.00 MEDICARE ANNUAL WELLNESS VISIT, SUBSEQUENT: ICD-10-CM

## 2024-03-01 DIAGNOSIS — E11.9 DIABETES MELLITUS TYPE II, NON INSULIN DEPENDENT (HCC): ICD-10-CM

## 2024-03-01 LAB
ALBUMIN SERPL-MCNC: 4.3 G/DL (ref 3.4–5)
ALBUMIN/GLOB SERPL: 1.2 {RATIO} (ref 1.1–2.2)
ALP SERPL-CCNC: 71 U/L (ref 40–129)
ALT SERPL-CCNC: 16 U/L (ref 10–40)
ANION GAP SERPL CALCULATED.3IONS-SCNC: 10 MMOL/L (ref 3–16)
AST SERPL-CCNC: 14 U/L (ref 15–37)
BASOPHILS # BLD: 0.1 K/UL (ref 0–0.2)
BASOPHILS NFR BLD: 0.5 %
BILIRUB SERPL-MCNC: 0.3 MG/DL (ref 0–1)
BUN SERPL-MCNC: 13 MG/DL (ref 7–20)
CALCIUM SERPL-MCNC: 9.6 MG/DL (ref 8.3–10.6)
CHLORIDE SERPL-SCNC: 95 MMOL/L (ref 99–110)
CHOLEST SERPL-MCNC: 119 MG/DL (ref 0–199)
CO2 SERPL-SCNC: 28 MMOL/L (ref 21–32)
CREAT SERPL-MCNC: 0.6 MG/DL (ref 0.6–1.1)
DEPRECATED RDW RBC AUTO: 12.5 % (ref 12.4–15.4)
EOSINOPHIL # BLD: 0.2 K/UL (ref 0–0.6)
EOSINOPHIL NFR BLD: 1.1 %
GFR SERPLBLD CREATININE-BSD FMLA CKD-EPI: >60 ML/MIN/{1.73_M2}
GLUCOSE SERPL-MCNC: 124 MG/DL (ref 70–99)
HCT VFR BLD AUTO: 46.5 % (ref 36–48)
HDLC SERPL-MCNC: 34 MG/DL (ref 40–60)
HGB BLD-MCNC: 15.7 G/DL (ref 12–16)
LDLC SERPL CALC-MCNC: 54 MG/DL
LYMPHOCYTES # BLD: 3.3 K/UL (ref 1–5.1)
LYMPHOCYTES NFR BLD: 24.8 %
MCH RBC QN AUTO: 30 PG (ref 26–34)
MCHC RBC AUTO-ENTMCNC: 33.8 G/DL (ref 31–36)
MCV RBC AUTO: 88.8 FL (ref 80–100)
MONOCYTES # BLD: 0.9 K/UL (ref 0–1.3)
MONOCYTES NFR BLD: 7 %
NEUTROPHILS # BLD: 8.8 K/UL (ref 1.7–7.7)
NEUTROPHILS NFR BLD: 66.6 %
PLATELET # BLD AUTO: 202 K/UL (ref 135–450)
PMV BLD AUTO: 9.4 FL (ref 5–10.5)
POTASSIUM SERPL-SCNC: 4.3 MMOL/L (ref 3.5–5.1)
PROT SERPL-MCNC: 7.9 G/DL (ref 6.4–8.2)
RBC # BLD AUTO: 5.24 M/UL (ref 4–5.2)
SODIUM SERPL-SCNC: 133 MMOL/L (ref 136–145)
TRIGL SERPL-MCNC: 153 MG/DL (ref 0–150)
TSH SERPL DL<=0.005 MIU/L-ACNC: 1.69 UIU/ML (ref 0.27–4.2)
VLDLC SERPL CALC-MCNC: 31 MG/DL
WBC # BLD AUTO: 13.3 K/UL (ref 4–11)

## 2024-03-01 PROCEDURE — 84443 ASSAY THYROID STIM HORMONE: CPT

## 2024-03-01 PROCEDURE — 36415 COLL VENOUS BLD VENIPUNCTURE: CPT

## 2024-03-01 PROCEDURE — 85025 COMPLETE CBC W/AUTO DIFF WBC: CPT

## 2024-03-01 PROCEDURE — 80053 COMPREHEN METABOLIC PANEL: CPT

## 2024-03-01 PROCEDURE — 80061 LIPID PANEL: CPT

## 2024-03-07 ENCOUNTER — OFFICE VISIT (OUTPATIENT)
Dept: PRIMARY CARE CLINIC | Age: 44
End: 2024-03-07
Payer: COMMERCIAL

## 2024-03-07 VITALS
HEIGHT: 69 IN | BODY MASS INDEX: 29.92 KG/M2 | WEIGHT: 202 LBS | OXYGEN SATURATION: 97 % | DIASTOLIC BLOOD PRESSURE: 60 MMHG | SYSTOLIC BLOOD PRESSURE: 118 MMHG | HEART RATE: 111 BPM

## 2024-03-07 DIAGNOSIS — J45.20 MILD INTERMITTENT ASTHMA WITHOUT COMPLICATION: ICD-10-CM

## 2024-03-07 DIAGNOSIS — E78.2 MIXED HYPERLIPIDEMIA: ICD-10-CM

## 2024-03-07 DIAGNOSIS — E11.9 DIABETES MELLITUS TYPE II, NON INSULIN DEPENDENT (HCC): ICD-10-CM

## 2024-03-07 DIAGNOSIS — Z00.00 MEDICARE ANNUAL WELLNESS VISIT, SUBSEQUENT: Primary | ICD-10-CM

## 2024-03-07 LAB — HBA1C MFR BLD: 7.6 %

## 2024-03-07 PROCEDURE — G0439 PPPS, SUBSEQ VISIT: HCPCS | Performed by: FAMILY MEDICINE

## 2024-03-07 PROCEDURE — 83036 HEMOGLOBIN GLYCOSYLATED A1C: CPT | Performed by: FAMILY MEDICINE

## 2024-03-07 RX ORDER — DIAZEPAM 5 MG/1
5 TABLET ORAL DAILY
COMMUNITY
Start: 2024-02-08

## 2024-03-07 ASSESSMENT — PATIENT HEALTH QUESTIONNAIRE - PHQ9
SUM OF ALL RESPONSES TO PHQ QUESTIONS 1-9: 2
1. LITTLE INTEREST OR PLEASURE IN DOING THINGS: 1
2. FEELING DOWN, DEPRESSED OR HOPELESS: 1
SUM OF ALL RESPONSES TO PHQ9 QUESTIONS 1 & 2: 2

## 2024-03-07 ASSESSMENT — LIFESTYLE VARIABLES
HOW MANY STANDARD DRINKS CONTAINING ALCOHOL DO YOU HAVE ON A TYPICAL DAY: 3 OR 4
HOW OFTEN DO YOU HAVE A DRINK CONTAINING ALCOHOL: MONTHLY OR LESS

## 2024-03-07 NOTE — PROGRESS NOTES
Chief Complaint   Patient presents with    Medicare AWV       Medicare Annual Wellness Visit  Name: Luna Che Today’s Date: 3/7/2024   MRN: [id number] Sex: Female   Age: 43 y.o. Ethnicity: Non- / Non    : 1980 Race: White (non-)      Luna Che is here for Medicare AWV    Patient is 43-year-old female here for her annual Medicare wellness. She is being treated for DMT2, Hyperlipidemia, asthma and schizoaffective disorder.  Still smokes a pack a day and no desire to quit.  Noted 7 pound weight loss from last visit and not sure how she did it.  Compliant with current medications.  Denies any concerns today.      Goes to her Psychiatrist for her Schizophrenia management, Lazaro Avendano/Priti Vitale. Follows-up every 3 months      Reviewed blood test done on 2024: Total cholesterol 119, triglyceride 153, HDL 34, LDL 54, TSH 1.69, sodium 133, potassium 4.3, chloride 95, glucose 124, creatinine 0.6, calcium 9.6, ALT 16, AST 14, WBC 13.3, RBC 5.24, hemoglobin 15.7, hematocrit 46.5, platelets 202.      Screenings for behavioral, psychosocial and functional/safety risks, and cognitive dysfunction are all negative except as indicated below. These results, as well as other patient data from the Health Risk Assessment form, are documented in Flowsheets linked to this Encounter.    Allergies   Allergen Reactions    Latuda [Lurasidone Hcl] Anaphylaxis    Lisinopril Swelling and Palpitations        Current Outpatient Medications   Medication Sig Dispense Refill    diazePAM (VALIUM) 5 MG tablet Take 1 tablet by mouth daily.      albuterol sulfate HFA (PROVENTIL;VENTOLIN;PROAIR) 108 (90 Base) MCG/ACT inhaler INHALE 1 TO 2 PUFFS BY MOUTH EVERY 6 HOURS AS NEEDED FOR WHEEZING 18 g 0    rosuvastatin (CRESTOR) 10 MG tablet Take 1 tablet by mouth daily 90 tablet 0    dapagliflozin (FARXIGA) 10 MG tablet TAKE 1 TABLET BY MOUTH EVERY MORNING 90 tablet 0    Dulaglutide (TRULICITY) 3 MG/0.5ML

## 2024-03-07 NOTE — PATIENT INSTRUCTIONS
attack. These may include:    Chest pain or pressure, or a strange feeling in the chest.     Sweating.     Shortness of breath.     Pain, pressure, or a strange feeling in the back, neck, jaw, or upper belly or in one or both shoulders or arms.     Lightheadedness or sudden weakness.     A fast or irregular heartbeat.   After you call 911, the  may tell you to chew 1 adult-strength or 2 to 4 low-dose aspirin. Wait for an ambulance. Do not try to drive yourself.  Watch closely for changes in your health, and be sure to contact your doctor if you have any problems.  Where can you learn more?  Go to https://www.[employer].net/patientEd and enter F075 to learn more about \"A Healthy Heart: Care Instructions.\"  Current as of: June 24, 2023               Content Version: 14.0  © 4153-6770 [employer].   Care instructions adapted under license by [employer]. If you have questions about a medical condition or this instruction, always ask your healthcare professional. [employer] disclaims any warranty or liability for your use of this information.      Personalized Preventive Plan for Luna Che - 3/7/2024  Medicare offers a range of preventive health benefits. Some of the tests and screenings are paid in full while other may be subject to a deductible, co-insurance, and/or copay.    Some of these benefits include a comprehensive review of your medical history including lifestyle, illnesses that may run in your family, and various assessments and screenings as appropriate.    After reviewing your medical record and screening and assessments performed today your provider may have ordered immunizations, labs, imaging, and/or referrals for you.  A list of these orders (if applicable) as well as your Preventive Care list are included within your After Visit Summary for your review.    Other Preventive Recommendations:    A preventive eye exam performed by an eye specialist is recommended

## 2024-04-25 DIAGNOSIS — J45.20 MILD INTERMITTENT ASTHMA WITHOUT COMPLICATION: ICD-10-CM

## 2024-04-26 RX ORDER — ALBUTEROL SULFATE 90 UG/1
AEROSOL, METERED RESPIRATORY (INHALATION)
Qty: 18 G | Refills: 3 | Status: SHIPPED | OUTPATIENT
Start: 2024-04-26

## 2024-04-26 NOTE — TELEPHONE ENCOUNTER
Recent Visits  Date Type Provider Dept   03/07/24 Office Visit Reyes, Eleia J, MD [facility]   09/21/23 Office Visit Reyes, Eleia J, MD [facility]   07/21/23 Office Visit Reyes, Eleia J, MD [facility]   Showing recent visits within past 540 days with a meds authorizing provider and meeting all other requirements  Future Appointments  Date Type Provider Dept   07/10/24 Appointment Reyes, Eleia J, MD [facility]   Showing future appointments within next 150 days with a meds authorizing provider and meeting all other requirements

## 2024-05-17 DIAGNOSIS — E11.9 DIABETES MELLITUS TYPE II, NON INSULIN DEPENDENT (HCC): ICD-10-CM

## 2024-05-17 RX ORDER — DULAGLUTIDE 3 MG/.5ML
INJECTION, SOLUTION SUBCUTANEOUS
Qty: 6 ML | Refills: 0 | Status: SHIPPED | OUTPATIENT
Start: 2024-05-17

## 2024-05-17 NOTE — TELEPHONE ENCOUNTER
Recent Visits  Date Type Provider Dept   03/07/24 Office Visit Reyes, Eleia J, MD [facility]   09/21/23 Office Visit Reyes, Eleia J, MD [facility]   07/21/23 Office Visit Reyes, Eleia J, MD [facility]   Showing recent visits within past 540 days with a meds authorizing provider and meeting all other requirements  Future Appointments  Date Type Provider Dept   07/10/24 Appointment Reyes, Eleia J, MD [facility]   Showing future appointments within next 150 days with a meds authorizing provider and meeting all other requirements

## 2024-05-30 DIAGNOSIS — E11.9 DIABETES MELLITUS TYPE II, NON INSULIN DEPENDENT (HCC): ICD-10-CM

## 2024-05-30 RX ORDER — DAPAGLIFLOZIN 10 MG/1
TABLET, FILM COATED ORAL
Qty: 90 TABLET | Refills: 0 | Status: SHIPPED | OUTPATIENT
Start: 2024-05-30

## 2024-05-30 NOTE — TELEPHONE ENCOUNTER
Recent Visits  Date Type Provider Dept   03/07/24 Office Visit Reyes, Eleia J, MD [facility]   09/21/23 Office Visit Reyes, Eleia J, MD [facility]   07/21/23 Office Visit Reyes, Eleia J, MD [facility]   Showing recent visits within past 540 days with a meds authorizing provider and meeting all other requirements  Future Appointments  Date Type Provider Dept   07/10/24 Appointment Reyes, Eleia J, MD [facility]   Showing future appointments within next 150 days with a meds authorizing provider and meeting all other requirements

## 2024-07-10 ENCOUNTER — OFFICE VISIT (OUTPATIENT)
Dept: PRIMARY CARE CLINIC | Age: 44
End: 2024-07-10
Payer: COMMERCIAL

## 2024-07-10 VITALS
SYSTOLIC BLOOD PRESSURE: 100 MMHG | HEART RATE: 97 BPM | DIASTOLIC BLOOD PRESSURE: 60 MMHG | OXYGEN SATURATION: 97 % | WEIGHT: 196 LBS | BODY MASS INDEX: 28.94 KG/M2

## 2024-07-10 DIAGNOSIS — E11.9 DIABETES MELLITUS TYPE II, NON INSULIN DEPENDENT (HCC): Primary | ICD-10-CM

## 2024-07-10 DIAGNOSIS — E78.2 MIXED HYPERLIPIDEMIA: ICD-10-CM

## 2024-07-10 DIAGNOSIS — J45.20 MILD INTERMITTENT ASTHMA WITHOUT COMPLICATION: ICD-10-CM

## 2024-07-10 LAB — HBA1C MFR BLD: 7.2 %

## 2024-07-10 PROCEDURE — 99214 OFFICE O/P EST MOD 30 MIN: CPT | Performed by: FAMILY MEDICINE

## 2024-07-10 PROCEDURE — 83036 HEMOGLOBIN GLYCOSYLATED A1C: CPT | Performed by: FAMILY MEDICINE

## 2024-07-10 PROCEDURE — 3051F HG A1C>EQUAL 7.0%<8.0%: CPT | Performed by: FAMILY MEDICINE

## 2024-07-10 RX ORDER — ROSUVASTATIN CALCIUM 10 MG/1
10 TABLET, COATED ORAL DAILY
Qty: 90 TABLET | Refills: 3 | Status: SHIPPED | OUTPATIENT
Start: 2024-07-10

## 2024-07-10 RX ORDER — ALBUTEROL SULFATE 90 UG/1
2 AEROSOL, METERED RESPIRATORY (INHALATION) EVERY 6 HOURS PRN
Qty: 3 EACH | Refills: 3 | Status: SHIPPED | OUTPATIENT
Start: 2024-07-10 | End: 2025-07-10

## 2024-07-10 RX ORDER — DULAGLUTIDE 3 MG/.5ML
INJECTION, SOLUTION SUBCUTANEOUS
Qty: 12 ADJUSTABLE DOSE PRE-FILLED PEN SYRINGE | Refills: 3 | Status: SHIPPED | OUTPATIENT
Start: 2024-07-10

## 2024-07-10 RX ORDER — DAPAGLIFLOZIN 10 MG/1
TABLET, FILM COATED ORAL
Qty: 90 TABLET | Refills: 3 | Status: SHIPPED | OUTPATIENT
Start: 2024-07-10

## 2024-07-10 ASSESSMENT — PATIENT HEALTH QUESTIONNAIRE - PHQ9
SUM OF ALL RESPONSES TO PHQ9 QUESTIONS 1 & 2: 1
SUM OF ALL RESPONSES TO PHQ QUESTIONS 1-9: 1
2. FEELING DOWN, DEPRESSED OR HOPELESS: NOT AT ALL
1. LITTLE INTEREST OR PLEASURE IN DOING THINGS: SEVERAL DAYS

## 2024-07-10 NOTE — PROGRESS NOTES
follow-up/fasting blood test.     Electronically signed by Eleia J Reyes, MD on 7/10/2024 at 2:31 PM      This dictation was generated by voice recognition computer software.  Although all attempts are made to edit the dictation for accuracy, there may be errors in the transcription that are not intended.

## 2024-09-11 DIAGNOSIS — J45.20 MILD INTERMITTENT ASTHMA WITHOUT COMPLICATION: ICD-10-CM

## 2024-09-11 DIAGNOSIS — E11.9 DIABETES MELLITUS TYPE II, NON INSULIN DEPENDENT (HCC): ICD-10-CM

## 2024-09-11 RX ORDER — ALBUTEROL SULFATE 90 UG/1
AEROSOL, METERED RESPIRATORY (INHALATION)
Qty: 18 G | Refills: 1 | Status: SHIPPED | OUTPATIENT
Start: 2024-09-11

## 2024-09-11 RX ORDER — DAPAGLIFLOZIN 10 MG/1
TABLET, FILM COATED ORAL
Qty: 90 TABLET | Refills: 3 | Status: SHIPPED | OUTPATIENT
Start: 2024-09-11

## 2024-11-12 ENCOUNTER — OFFICE VISIT (OUTPATIENT)
Dept: PRIMARY CARE CLINIC | Age: 44
End: 2024-11-12

## 2024-11-12 VITALS
WEIGHT: 195 LBS | OXYGEN SATURATION: 98 % | SYSTOLIC BLOOD PRESSURE: 119 MMHG | BODY MASS INDEX: 28.8 KG/M2 | HEART RATE: 83 BPM | DIASTOLIC BLOOD PRESSURE: 60 MMHG

## 2024-11-12 DIAGNOSIS — E11.9 DIABETES MELLITUS TYPE II, NON INSULIN DEPENDENT (HCC): Primary | ICD-10-CM

## 2024-11-12 DIAGNOSIS — Z23 NEED FOR INFLUENZA VACCINATION: ICD-10-CM

## 2024-11-12 DIAGNOSIS — J45.20 MILD INTERMITTENT ASTHMA WITHOUT COMPLICATION: ICD-10-CM

## 2024-11-12 DIAGNOSIS — E78.2 MIXED HYPERLIPIDEMIA: ICD-10-CM

## 2024-11-12 LAB — HBA1C MFR BLD: 6.6 %

## 2024-11-12 SDOH — ECONOMIC STABILITY: FOOD INSECURITY: WITHIN THE PAST 12 MONTHS, THE FOOD YOU BOUGHT JUST DIDN'T LAST AND YOU DIDN'T HAVE MONEY TO GET MORE.: NEVER TRUE

## 2024-11-12 SDOH — ECONOMIC STABILITY: FOOD INSECURITY: WITHIN THE PAST 12 MONTHS, YOU WORRIED THAT YOUR FOOD WOULD RUN OUT BEFORE YOU GOT MONEY TO BUY MORE.: NEVER TRUE

## 2024-11-12 SDOH — ECONOMIC STABILITY: INCOME INSECURITY: HOW HARD IS IT FOR YOU TO PAY FOR THE VERY BASICS LIKE FOOD, HOUSING, MEDICAL CARE, AND HEATING?: NOT HARD AT ALL

## 2024-11-12 ASSESSMENT — PATIENT HEALTH QUESTIONNAIRE - PHQ9
SUM OF ALL RESPONSES TO PHQ QUESTIONS 1-9: 0
SUM OF ALL RESPONSES TO PHQ QUESTIONS 1-9: 0
1. LITTLE INTEREST OR PLEASURE IN DOING THINGS: NOT AT ALL
SUM OF ALL RESPONSES TO PHQ QUESTIONS 1-9: 0
SUM OF ALL RESPONSES TO PHQ QUESTIONS 1-9: 0
2. FEELING DOWN, DEPRESSED OR HOPELESS: NOT AT ALL
SUM OF ALL RESPONSES TO PHQ9 QUESTIONS 1 & 2: 0

## 2024-11-12 NOTE — PROGRESS NOTES
Chief Complaint   Patient presents with    Diabetes     Pt would like her hormone checked for menopause. Last menses was March Subjective:       Luna Che is a 44 y.o. female here for 3-month follow-up on her diabetes.  Last hemoglobin A1c was 7.2% from 7.6%.  Currently on Trulicity 3 mg weekly and Farxiga 10 mg daily.  Patient is been doing better with her diet and keeping the weight off.  Still smokes a pack a day.  Also wants her hormone level checked since she has not had a period since March.  Denies any hot flashes or headaches.  Patient denies any chest pains, shortness of breath, abdominal pain, melena or hematochezia.    Goes to her Psychiatrist for her Schizophrenia management, Lazaro Avendano/Priti Vitale. Follows-up every 3 months     Reviewed blood test done on March 1, 2024: Total cholesterol 119, triglyceride 153, HDL 34, LDL 54, TSH 1.69, sodium 133, potassium 4.3, glucose 124, BUN 13, creatinine 0.6, calcium 9.6, ALT 16, AST 14, TSH 1.69, hemoglobin A1c 7.6%    Current Outpatient Medications   Medication Sig Dispense Refill    albuterol sulfate HFA (PROVENTIL;VENTOLIN;PROAIR) 108 (90 Base) MCG/ACT inhaler INHALE 1 TO 2 PUFFS BY MOUTH EVERY 6 HOURS AS NEEDED FOR WHEEZING 18 g 1    dapagliflozin (FARXIGA) 10 MG tablet TAKE 1 TABLET BY MOUTH EVERY MORNING 90 tablet 3    Dulaglutide (TRULICITY) 3 MG/0.5ML SOPN INJECT 3 MG SUBCUTANEOUS ONE DAY A WEEK 12 Adjustable Dose Pre-filled Pen Syringe 3    rosuvastatin (CRESTOR) 10 MG tablet Take 1 tablet by mouth daily 90 tablet 3    diazePAM (VALIUM) 5 MG tablet Take 1 tablet by mouth daily.      traZODone (DESYREL) 100 MG tablet Take 3 tablets by mouth nightly 100 mg afternoon and 200 mg at night      paliperidone Palmitate (INVEGA TRINZA) 273 MG/0.875ML SUSP IM injection Inject 273 mg into the muscle once Once every 11 weeks      ziprasidone (GEODON) 60 MG capsule Take 1 capsule by mouth every morning      FLUoxetine (PROZAC) 20 MG capsule Take

## 2024-11-13 LAB
CREAT UR-MCNC: 70.1 MG/DL (ref 28–259)
MICROALBUMIN UR DL<=1MG/L-MCNC: 2.29 MG/DL
MICROALBUMIN/CREAT UR: 32.7 MG/G (ref 0–30)

## 2024-11-25 ENCOUNTER — OFFICE VISIT (OUTPATIENT)
Age: 44
End: 2024-11-25

## 2024-11-25 VITALS
WEIGHT: 195 LBS | BODY MASS INDEX: 28.88 KG/M2 | HEIGHT: 69 IN | OXYGEN SATURATION: 96 % | TEMPERATURE: 97.9 F | HEART RATE: 95 BPM | DIASTOLIC BLOOD PRESSURE: 69 MMHG | SYSTOLIC BLOOD PRESSURE: 110 MMHG

## 2024-11-25 DIAGNOSIS — N76.0 VAGINOSIS: Primary | ICD-10-CM

## 2024-11-25 RX ORDER — PHENAZOPYRIDINE HYDROCHLORIDE 200 MG/1
200 TABLET, FILM COATED ORAL 3 TIMES DAILY PRN
Qty: 6 TABLET | Refills: 0 | Status: SHIPPED | OUTPATIENT
Start: 2024-11-25 | End: 2024-11-27

## 2024-11-25 RX ORDER — FLUCONAZOLE 150 MG/1
150 TABLET ORAL
Qty: 2 TABLET | Refills: 0 | Status: SHIPPED | OUTPATIENT
Start: 2024-11-25 | End: 2024-12-01

## 2024-11-25 NOTE — PROGRESS NOTES
Luna Che (:  1980) is a 44 y.o. female,New patient, here for evaluation of the following chief complaint(s):  Vaginal Discharge (Vaginal discharge, was recently treated for yeast)      Assessment & Plan :  1. Vaginosis    - C.trachomatis N.gonorrhoeae DNA, Urine  - Vaginitis Panel PCR; Future  - Culture, Urine  - Trichomonas by EIA  - fluconazole (DIFLUCAN) 150 MG tablet; Take 1 tablet by mouth every 72 hours for 6 days  Dispense: 2 tablet; Refill: 0  - Vaginitis Panel PCR  - phenazopyridine (PYRIDIUM) 200 MG tablet; Take 1 tablet by mouth 3 times daily as needed for Pain  Dispense: 6 tablet; Refill: 0     Advised the patient that we will send urine specimen for further testing. Results are typically available in 2-3 days.  Patient stated ok to leave voice message with results.     Subjective :  Vaginal Discharge (Vaginal discharge, was recently treated for yeast)  Pt states was told by PCP to try OTC yeast tx. Pt states tried 2x's without resolution. Still having vaginal discharge and discomfort with urination.      History provided by:  Patient    HPI:   44 y.o. female presents with symptoms of dysuria and vaginal discharge/discomfort         Vitals:    24 1137   BP: 110/69   Site: Right Upper Arm   Position: Sitting   Cuff Size: Medium Adult   Pulse: 95   Temp: 97.9 °F (36.6 °C)   TempSrc: Oral   SpO2: 96%   Weight: 88.5 kg (195 lb)   Height: 1.753 m (5' 9\")       No results found for this visit on 24.      Objective   Physical Exam  Constitutional:       General: She is not in acute distress.     Appearance: Normal appearance.   HENT:      Mouth/Throat:      Lips: Pink.   Eyes:      General: Lids are normal.   Cardiovascular:      Rate and Rhythm: Normal rate.   Pulmonary:      Effort: Pulmonary effort is normal.   Abdominal:      Palpations: Abdomen is soft.      Tenderness: There is abdominal tenderness in the suprapubic area.   Skin:     General: Skin is warm and dry.

## 2024-11-25 NOTE — PATIENT INSTRUCTIONS
Discharge medications reviewed with the patient and appropriate educational materials and side effects teaching were provided.    Advised the patient that we will send urine specimen for further testing. Results are typically available in 2-3 days.  Patient stated ok to leave voice message with results.

## 2024-11-26 LAB
BV BACTERIA DNA VAG QL NAA+PROBE: NOT DETECTED
C GLABRATA DNA VAG QL NAA+PROBE: NORMAL
C GLABRATA DNA VAG QL NAA+PROBE: NOT DETECTED
C KRUSEI DNA VAG QL NAA+PROBE: NOT DETECTED
C TRACH DNA UR QL NAA+PROBE: NEGATIVE
CANDIDA DNA VAG QL NAA+PROBE: NOT DETECTED
N GONORRHOEA DNA UR QL NAA+PROBE: NEGATIVE
T VAGINALIS DNA VAG QL NAA+PROBE: NOT DETECTED

## 2024-11-27 ENCOUNTER — TELEPHONE (OUTPATIENT)
Age: 44
End: 2024-11-27

## 2024-11-27 DIAGNOSIS — N30.00 ACUTE CYSTITIS WITHOUT HEMATURIA: Primary | ICD-10-CM

## 2024-11-27 LAB
BACTERIA UR CULT: ABNORMAL
ORGANISM: ABNORMAL

## 2024-11-27 RX ORDER — NITROFURANTOIN 25; 75 MG/1; MG/1
100 CAPSULE ORAL 2 TIMES DAILY
Qty: 20 CAPSULE | Refills: 0 | Status: SHIPPED | OUTPATIENT
Start: 2024-11-27 | End: 2024-12-07

## 2024-11-27 NOTE — TELEPHONE ENCOUNTER
Call made to pt. Verified name and . Lab results reviewed with pt. Made aware Macrobid was sent to [facility] ( on file). Dosing explained. Verbalized understanding. All questions answered.

## 2025-03-04 ENCOUNTER — TELEPHONE (OUTPATIENT)
Dept: PRIMARY CARE CLINIC | Age: 45
End: 2025-03-04

## 2025-03-04 NOTE — TELEPHONE ENCOUNTER
----- Message from Moises KWOK sent at 3/4/2025 10:55 AM EST -----  Regarding: [facility] Appointment Request  [facility] Appointment Request    Patient needs appointment for [facility] Appointment Type: Annual Visit.    Patient Requested Dates(s):Mid of April   Patient Requested Time:afternoon  Provider Name: Reyes, Eleia J, MD        Reason for Appointment Request: Other Patient is also asking for a medication refill.Patient is asking to have multiple appointments on same day  --------------------------------------------------------------------------------------------------------------------------    Relationship to Patient: Self     Call Back Information: OK to leave message on voicemail  Preferred Call Back Number: Phone [phone]

## 2025-04-16 ENCOUNTER — OFFICE VISIT (OUTPATIENT)
Dept: PRIMARY CARE CLINIC | Age: 45
End: 2025-04-16
Payer: COMMERCIAL

## 2025-04-16 VITALS
BODY MASS INDEX: 28.94 KG/M2 | OXYGEN SATURATION: 97 % | WEIGHT: 196 LBS | HEART RATE: 88 BPM | SYSTOLIC BLOOD PRESSURE: 108 MMHG | DIASTOLIC BLOOD PRESSURE: 60 MMHG

## 2025-04-16 DIAGNOSIS — E11.9 DIABETES MELLITUS TYPE II, NON INSULIN DEPENDENT (HCC): Primary | ICD-10-CM

## 2025-04-16 DIAGNOSIS — Z12.31 BREAST CANCER SCREENING BY MAMMOGRAM: ICD-10-CM

## 2025-04-16 DIAGNOSIS — E78.2 MIXED HYPERLIPIDEMIA: ICD-10-CM

## 2025-04-16 DIAGNOSIS — J45.20 MILD INTERMITTENT ASTHMA WITHOUT COMPLICATION: ICD-10-CM

## 2025-04-16 LAB
ALBUMIN SERPL-MCNC: 4.6 G/DL (ref 3.4–5)
ALBUMIN/GLOB SERPL: 1.6 {RATIO} (ref 1.1–2.2)
ALP SERPL-CCNC: 70 U/L (ref 40–129)
ALT SERPL-CCNC: 28 U/L (ref 10–40)
ANION GAP SERPL CALCULATED.3IONS-SCNC: 12 MMOL/L (ref 3–16)
AST SERPL-CCNC: 22 U/L (ref 15–37)
BASOPHILS # BLD: 0.1 K/UL (ref 0–0.2)
BASOPHILS NFR BLD: 0.6 %
BILIRUB SERPL-MCNC: 0.4 MG/DL (ref 0–1)
BUN SERPL-MCNC: 12 MG/DL (ref 7–20)
CALCIUM SERPL-MCNC: 9.2 MG/DL (ref 8.3–10.6)
CHLORIDE SERPL-SCNC: 101 MMOL/L (ref 99–110)
CHOLEST SERPL-MCNC: 159 MG/DL (ref 0–199)
CO2 SERPL-SCNC: 25 MMOL/L (ref 21–32)
CREAT SERPL-MCNC: 0.6 MG/DL (ref 0.6–1.1)
DEPRECATED RDW RBC AUTO: 13.1 % (ref 12.4–15.4)
EOSINOPHIL # BLD: 0.2 K/UL (ref 0–0.6)
EOSINOPHIL NFR BLD: 1.4 %
GFR SERPLBLD CREATININE-BSD FMLA CKD-EPI: >90 ML/MIN/{1.73_M2}
GLUCOSE SERPL-MCNC: 103 MG/DL (ref 70–99)
HBA1C MFR BLD: 6.8 %
HCT VFR BLD AUTO: 46.5 % (ref 36–48)
HDLC SERPL-MCNC: 44 MG/DL (ref 40–60)
HGB BLD-MCNC: 15.6 G/DL (ref 12–16)
LDLC SERPL CALC-MCNC: 90 MG/DL
LYMPHOCYTES # BLD: 3.1 K/UL (ref 1–5.1)
LYMPHOCYTES NFR BLD: 27.8 %
MCH RBC QN AUTO: 30.8 PG (ref 26–34)
MCHC RBC AUTO-ENTMCNC: 33.5 G/DL (ref 31–36)
MCV RBC AUTO: 92 FL (ref 80–100)
MONOCYTES # BLD: 0.7 K/UL (ref 0–1.3)
MONOCYTES NFR BLD: 6 %
NEUTROPHILS # BLD: 7.3 K/UL (ref 1.7–7.7)
NEUTROPHILS NFR BLD: 64.2 %
PLATELET # BLD AUTO: 185 K/UL (ref 135–450)
PMV BLD AUTO: 9.9 FL (ref 5–10.5)
POTASSIUM SERPL-SCNC: 4.4 MMOL/L (ref 3.5–5.1)
PROT SERPL-MCNC: 7.5 G/DL (ref 6.4–8.2)
RBC # BLD AUTO: 5.05 M/UL (ref 4–5.2)
SODIUM SERPL-SCNC: 138 MMOL/L (ref 136–145)
TRIGL SERPL-MCNC: 126 MG/DL (ref 0–150)
VLDLC SERPL CALC-MCNC: 25 MG/DL
WBC # BLD AUTO: 11.3 K/UL (ref 4–11)

## 2025-04-16 PROCEDURE — 36415 COLL VENOUS BLD VENIPUNCTURE: CPT | Performed by: FAMILY MEDICINE

## 2025-04-16 PROCEDURE — 3044F HG A1C LEVEL LT 7.0%: CPT | Performed by: FAMILY MEDICINE

## 2025-04-16 PROCEDURE — 83036 HEMOGLOBIN GLYCOSYLATED A1C: CPT | Performed by: FAMILY MEDICINE

## 2025-04-16 PROCEDURE — 99214 OFFICE O/P EST MOD 30 MIN: CPT | Performed by: FAMILY MEDICINE

## 2025-04-16 RX ORDER — ROSUVASTATIN CALCIUM 10 MG/1
10 TABLET, COATED ORAL DAILY
Qty: 90 TABLET | Refills: 3 | Status: SHIPPED | OUTPATIENT
Start: 2025-04-16

## 2025-04-16 RX ORDER — ALBUTEROL SULFATE 90 UG/1
2 INHALANT RESPIRATORY (INHALATION) EVERY 6 HOURS PRN
Qty: 18 G | Refills: 3 | Status: SHIPPED | OUTPATIENT
Start: 2025-04-16

## 2025-04-16 RX ORDER — DAPAGLIFLOZIN 10 MG/1
TABLET, FILM COATED ORAL
Qty: 90 TABLET | Refills: 3 | Status: SHIPPED | OUTPATIENT
Start: 2025-04-16

## 2025-04-16 SDOH — ECONOMIC STABILITY: FOOD INSECURITY: WITHIN THE PAST 12 MONTHS, YOU WORRIED THAT YOUR FOOD WOULD RUN OUT BEFORE YOU GOT MONEY TO BUY MORE.: NEVER TRUE

## 2025-04-16 SDOH — ECONOMIC STABILITY: FOOD INSECURITY: WITHIN THE PAST 12 MONTHS, THE FOOD YOU BOUGHT JUST DIDN'T LAST AND YOU DIDN'T HAVE MONEY TO GET MORE.: NEVER TRUE

## 2025-04-16 ASSESSMENT — ANXIETY QUESTIONNAIRES
6. BECOMING EASILY ANNOYED OR IRRITABLE: NOT AT ALL
3. WORRYING TOO MUCH ABOUT DIFFERENT THINGS: SEVERAL DAYS
GAD7 TOTAL SCORE: 5
4. TROUBLE RELAXING: SEVERAL DAYS
2. NOT BEING ABLE TO STOP OR CONTROL WORRYING: SEVERAL DAYS
7. FEELING AFRAID AS IF SOMETHING AWFUL MIGHT HAPPEN: SEVERAL DAYS
5. BEING SO RESTLESS THAT IT IS HARD TO SIT STILL: NOT AT ALL
1. FEELING NERVOUS, ANXIOUS, OR ON EDGE: SEVERAL DAYS
IF YOU CHECKED OFF ANY PROBLEMS ON THIS QUESTIONNAIRE, HOW DIFFICULT HAVE THESE PROBLEMS MADE IT FOR YOU TO DO YOUR WORK, TAKE CARE OF THINGS AT HOME, OR GET ALONG WITH OTHER PEOPLE: SOMEWHAT DIFFICULT

## 2025-04-16 ASSESSMENT — PATIENT HEALTH QUESTIONNAIRE - PHQ9
SUM OF ALL RESPONSES TO PHQ QUESTIONS 1-9: 0
SUM OF ALL RESPONSES TO PHQ QUESTIONS 1-9: 0
2. FEELING DOWN, DEPRESSED OR HOPELESS: NOT AT ALL
1. LITTLE INTEREST OR PLEASURE IN DOING THINGS: NOT AT ALL
SUM OF ALL RESPONSES TO PHQ QUESTIONS 1-9: 0
SUM OF ALL RESPONSES TO PHQ QUESTIONS 1-9: 0

## 2025-04-16 NOTE — PROGRESS NOTES
DTR 2+ bilateral      Assessment/Plan:   1. Diabetes mellitus type II, non insulin dependent (HCC)  POC A1c is at goal 6.8%.  Although went up from 6.2% as long she keeps under 7.5%.  Continue Farxiga 10 mg daily and will try to switch to Ozempic 1 mg weekly from Trulicity 3 mg weekly and might be able to lose weight with the switch.  Await eye examination tomorrow.  Reminded regular footcare.  Encouraged to quit smoking.  - POCT glycosylated hemoglobin (Hb A1C)  - Lipid Panel  - Comprehensive Metabolic Panel  - CBC with Auto Differential  - dapagliflozin (FARXIGA) 10 MG tablet; TAKE 1 TABLET BY MOUTH EVERY MORNING  Dispense: 90 tablet; Refill: 3  - Semaglutide, 1 MG/DOSE, (OZEMPIC) 4 MG/3ML SOPN sc injection; Inject 1 mg into the skin every 7 days  Dispense: 9 mL; Refill: 3    2. Mixed hyperlipidemia  Last LDL was 54 and the goal is under 80 being diabetic.  Currently on Crestor 10 mg at night and will check level if dose adjustment needed.  - Lipid Panel  - Comprehensive Metabolic Panel  - rosuvastatin (CRESTOR) 10 MG tablet; Take 1 tablet by mouth daily  Dispense: 90 tablet; Refill: 3    3. Mild intermittent asthma without complication  So far no recent flareup.  Refilled albuterol as requested.  - CBC with Auto Differential  - albuterol sulfate HFA (PROVENTIL;VENTOLIN;PROAIR) 108 (90 Base) MCG/ACT inhaler; Inhale 2 puffs into the lungs every 6 hours as needed for Wheezing  Dispense: 18 g; Refill: 3    4. Breast cancer screening by mammogram  Prescription given for mammogram.  - [facility] DIGITAL SCREEN BILATERAL; Future    5.  Schizophrenia  Stable emotionally.  PHQ-9 scored 0.  Continue to follow-up with her psychiatrist.  Currently on Valium, fluoxetine, Vistaril, Invega, trazodone and Geodon.    Return in about 3 months (around 7/16/2025) for Medicare AWV/DM/pap smear.     Electronically signed by Eleia J Reyes, MD on 4/16/2025 at 1:17 PM      This dictation was generated by voice recognition computer

## 2025-04-17 LAB — DIABETIC RETINOPATHY: NEGATIVE

## 2025-04-21 ENCOUNTER — RESULTS FOLLOW-UP (OUTPATIENT)
Dept: PRIMARY CARE CLINIC | Age: 45
End: 2025-04-21

## 2025-04-24 NOTE — TELEPHONE ENCOUNTER
Patient is aware of lab results. She has a question, if they ran a menopause test on the blood work she completed.

## 2025-06-04 ENCOUNTER — OFFICE VISIT (OUTPATIENT)
Dept: PRIMARY CARE CLINIC | Age: 45
End: 2025-06-04
Payer: COMMERCIAL

## 2025-06-04 VITALS
RESPIRATION RATE: 16 BRPM | HEIGHT: 69 IN | WEIGHT: 193 LBS | HEART RATE: 95 BPM | BODY MASS INDEX: 28.58 KG/M2 | SYSTOLIC BLOOD PRESSURE: 104 MMHG | OXYGEN SATURATION: 97 % | DIASTOLIC BLOOD PRESSURE: 58 MMHG

## 2025-06-04 DIAGNOSIS — E11.9 DIABETES MELLITUS TYPE II, NON INSULIN DEPENDENT (HCC): ICD-10-CM

## 2025-06-04 DIAGNOSIS — L73.9 FOLLICULITIS: ICD-10-CM

## 2025-06-04 DIAGNOSIS — R35.0 URINARY FREQUENCY: Primary | ICD-10-CM

## 2025-06-04 LAB
BILIRUBIN, POC: NEGATIVE
BLOOD URINE, POC: ABNORMAL
CLARITY, POC: ABNORMAL
COLOR, POC: YELLOW
GLUCOSE URINE, POC: 500 MG/DL
KETONES, POC: NEGATIVE MG/DL
LEUKOCYTE EST, POC: ABNORMAL
NITRITE, POC: NEGATIVE
PH, POC: 6
PROTEIN, POC: 30 MG/DL
SPECIFIC GRAVITY, POC: 1.01
UROBILINOGEN, POC: 0.2 MG/DL

## 2025-06-04 PROCEDURE — 81002 URINALYSIS NONAUTO W/O SCOPE: CPT | Performed by: FAMILY MEDICINE

## 2025-06-04 PROCEDURE — 3044F HG A1C LEVEL LT 7.0%: CPT | Performed by: FAMILY MEDICINE

## 2025-06-04 PROCEDURE — 99214 OFFICE O/P EST MOD 30 MIN: CPT | Performed by: FAMILY MEDICINE

## 2025-06-04 RX ORDER — MUPIROCIN 20 MG/G
OINTMENT TOPICAL
Qty: 22 G | Refills: 0 | Status: SHIPPED | OUTPATIENT
Start: 2025-06-04 | End: 2025-06-11

## 2025-06-04 RX ORDER — SULFAMETHOXAZOLE AND TRIMETHOPRIM 400; 80 MG/1; MG/1
1 TABLET ORAL DAILY
Qty: 5 TABLET | Refills: 0 | Status: SHIPPED | OUTPATIENT
Start: 2025-06-04 | End: 2025-06-09

## 2025-06-04 NOTE — PROGRESS NOTES
medications for this visit.      Allergies   Allergen Reactions    Latuda [Lurasidone Hcl] Anaphylaxis    Lisinopril Swelling and Palpitations       Objective:   BP (!) 104/58   Pulse 95   Resp 16   Ht 1.753 m (5' 9\")   Wt 87.5 kg (193 lb)   LMP 03/01/2024 (Approximate)   SpO2 97%   BMI 28.50 kg/m²   Alert oriented, NAD, ambulatory  HEENT: unremarkable  Chest/Lungs: clear to ausculation, no wheezing/rales  Heart: RR, normal S1S2, no murmur  Skin: Follicular lesion on the right groin area with purulent drainage and mild redness but no tenderness.  Extremities: good ROM, good pulses  Neurologic: grossly normal, DTR 2+ bilateral      Assessment/Plan:   1. Urinary frequency  Trace blood and leukocyte.  Increase water intake.  Urinary frequency could be related to hyperglycemia and from Farxiga.  - POCT Urinalysis no Micro    2. Diabetes mellitus type II, non insulin dependent (HCC)  Increase Ozempic from 1 to 2 mg every 7 days for the diabetes and help with the weight loss.  Discussed benefits and side effects.  Do not rely on the medication alone to help with the weight loss.  Encouraged to do better with her diet and exercise.  Small frequent meals and do not eat after 6 PM.  No soda and chips.  - semaglutide, 2 MG/DOSE, (OZEMPIC) 8 MG/3ML SOPN sc injection; Inject 2 mg into the skin every 7 days  Dispense: 3 mL; Refill: 3    3. Folliculitis  Skin care discussed.  Will try Bactrim and Bactroban ointment.  If there is a mild UTI, Bactrim might help.  Do not squeeze and try to keep it covered to reduce irritation from the panty line.  - sulfamethoxazole-trimethoprim (BACTRIM) 400-80 MG per tablet; Take 1 tablet by mouth daily for 5 days  Dispense: 5 tablet; Refill: 0  - mupirocin (BACTROBAN) 2 % ointment; Apply topically 2 times daily until healed  Dispense: 22 g; Refill: 0      Return in about 2 months (around 8/4/2025) for Medicare AWV/DM and Pap Smear.     Electronically signed by Eleia J Reyes, MD on 6/4/2025

## 2025-06-05 ENCOUNTER — TELEPHONE (OUTPATIENT)
Dept: PRIMARY CARE CLINIC | Age: 45
End: 2025-06-05

## 2025-06-05 DIAGNOSIS — E11.9 DIABETES MELLITUS TYPE II, NON INSULIN DEPENDENT (HCC): Primary | ICD-10-CM

## 2025-08-06 DIAGNOSIS — E11.9 DIABETES MELLITUS TYPE II, NON INSULIN DEPENDENT (HCC): ICD-10-CM

## 2025-08-06 DIAGNOSIS — E78.2 MIXED HYPERLIPIDEMIA: ICD-10-CM

## 2025-08-06 DIAGNOSIS — J45.20 MILD INTERMITTENT ASTHMA WITHOUT COMPLICATION: ICD-10-CM

## 2025-08-06 RX ORDER — ALBUTEROL SULFATE 90 UG/1
2 INHALANT RESPIRATORY (INHALATION) EVERY 6 HOURS PRN
Qty: 18 G | Refills: 3 | Status: SHIPPED | OUTPATIENT
Start: 2025-08-06

## 2025-08-06 RX ORDER — DAPAGLIFLOZIN 10 MG/1
TABLET, FILM COATED ORAL
Qty: 90 TABLET | Refills: 3 | Status: SHIPPED | OUTPATIENT
Start: 2025-08-06

## 2025-08-06 RX ORDER — ROSUVASTATIN CALCIUM 10 MG/1
10 TABLET, COATED ORAL DAILY
Qty: 90 TABLET | Refills: 3 | Status: SHIPPED | OUTPATIENT
Start: 2025-08-06

## 2025-08-26 ENCOUNTER — HOSPITAL ENCOUNTER (OUTPATIENT)
Dept: WOMENS IMAGING | Age: 45
Discharge: HOME | End: 2025-08-26
Payer: COMMERCIAL

## 2025-08-26 VITALS — WEIGHT: 194 LBS | BODY MASS INDEX: 28.73 KG/M2 | HEIGHT: 69 IN

## 2025-08-26 DIAGNOSIS — Z12.31 BREAST CANCER SCREENING BY MAMMOGRAM: ICD-10-CM

## 2025-08-26 PROCEDURE — 77063 BREAST TOMOSYNTHESIS BI: CPT
